# Patient Record
Sex: MALE | Race: WHITE | NOT HISPANIC OR LATINO | Employment: PART TIME | URBAN - METROPOLITAN AREA
[De-identification: names, ages, dates, MRNs, and addresses within clinical notes are randomized per-mention and may not be internally consistent; named-entity substitution may affect disease eponyms.]

---

## 2017-02-23 ENCOUNTER — COMMUNICATION - HEALTHEAST (OUTPATIENT)
Dept: ADMINISTRATIVE | Facility: CLINIC | Age: 57
End: 2017-02-23

## 2017-05-18 ENCOUNTER — RECORDS - HEALTHEAST (OUTPATIENT)
Dept: LAB | Facility: CLINIC | Age: 57
End: 2017-05-18

## 2017-05-18 LAB
CHOLEST SERPL-MCNC: 145 MG/DL
FASTING STATUS PATIENT QL REPORTED: ABNORMAL
HDLC SERPL-MCNC: 36 MG/DL
LDLC SERPL CALC-MCNC: 49 MG/DL
TRIGL SERPL-MCNC: 300 MG/DL

## 2017-06-02 ENCOUNTER — RECORDS - HEALTHEAST (OUTPATIENT)
Dept: LAB | Facility: CLINIC | Age: 57
End: 2017-06-02

## 2017-06-02 LAB
CHOLEST SERPL-MCNC: 243 MG/DL
FASTING STATUS PATIENT QL REPORTED: YES
HDLC SERPL-MCNC: 33 MG/DL
LDLC SERPL CALC-MCNC: 126 MG/DL
LDLC SERPL CALC-MCNC: ABNORMAL MG/DL
TRIGL SERPL-MCNC: 629 MG/DL

## 2019-09-29 ENCOUNTER — HEALTH MAINTENANCE LETTER (OUTPATIENT)
Age: 59
End: 2019-09-29

## 2020-03-15 ENCOUNTER — HEALTH MAINTENANCE LETTER (OUTPATIENT)
Age: 60
End: 2020-03-15

## 2021-01-14 ENCOUNTER — HEALTH MAINTENANCE LETTER (OUTPATIENT)
Age: 61
End: 2021-01-14

## 2021-05-08 ENCOUNTER — HEALTH MAINTENANCE LETTER (OUTPATIENT)
Age: 61
End: 2021-05-08

## 2021-08-28 ENCOUNTER — HEALTH MAINTENANCE LETTER (OUTPATIENT)
Age: 61
End: 2021-08-28

## 2021-10-23 ENCOUNTER — HEALTH MAINTENANCE LETTER (OUTPATIENT)
Age: 61
End: 2021-10-23

## 2021-12-18 ENCOUNTER — HEALTH MAINTENANCE LETTER (OUTPATIENT)
Age: 61
End: 2021-12-18

## 2022-02-12 ENCOUNTER — HEALTH MAINTENANCE LETTER (OUTPATIENT)
Age: 62
End: 2022-02-12

## 2022-04-09 ENCOUNTER — HEALTH MAINTENANCE LETTER (OUTPATIENT)
Age: 62
End: 2022-04-09

## 2022-07-30 ENCOUNTER — HEALTH MAINTENANCE LETTER (OUTPATIENT)
Age: 62
End: 2022-07-30

## 2022-08-24 ENCOUNTER — TRANSFERRED RECORDS (OUTPATIENT)
Dept: HEALTH INFORMATION MANAGEMENT | Facility: CLINIC | Age: 62
End: 2022-08-24

## 2022-09-08 ENCOUNTER — MEDICAL CORRESPONDENCE (OUTPATIENT)
Dept: HEALTH INFORMATION MANAGEMENT | Facility: CLINIC | Age: 62
End: 2022-09-08

## 2022-09-12 ENCOUNTER — TRANSCRIBE ORDERS (OUTPATIENT)
Dept: OTHER | Age: 62
End: 2022-09-12

## 2022-09-12 DIAGNOSIS — E11.9 TYPE 2 DIABETES MELLITUS WITHOUT COMPLICATION (H): Primary | ICD-10-CM

## 2022-10-10 ENCOUNTER — HEALTH MAINTENANCE LETTER (OUTPATIENT)
Age: 62
End: 2022-10-10

## 2022-11-27 ENCOUNTER — HEALTH MAINTENANCE LETTER (OUTPATIENT)
Age: 62
End: 2022-11-27

## 2023-01-03 ENCOUNTER — TRANSFERRED RECORDS (OUTPATIENT)
Dept: HEALTH INFORMATION MANAGEMENT | Facility: CLINIC | Age: 63
End: 2023-01-03

## 2023-02-18 ENCOUNTER — HEALTH MAINTENANCE LETTER (OUTPATIENT)
Age: 63
End: 2023-02-18

## 2023-03-08 ENCOUNTER — NURSE TRIAGE (OUTPATIENT)
Dept: NURSING | Facility: CLINIC | Age: 63
End: 2023-03-08
Payer: COMMERCIAL

## 2023-03-09 ENCOUNTER — TRANSFERRED RECORDS (OUTPATIENT)
Dept: HEALTH INFORMATION MANAGEMENT | Facility: CLINIC | Age: 63
End: 2023-03-09
Payer: COMMERCIAL

## 2023-03-09 ENCOUNTER — MEDICAL CORRESPONDENCE (OUTPATIENT)
Dept: HEALTH INFORMATION MANAGEMENT | Facility: CLINIC | Age: 63
End: 2023-03-09
Payer: COMMERCIAL

## 2023-03-09 LAB
ALT SERPL-CCNC: 19 U/L (ref 4–50)
AST SERPL-CCNC: 20 U/L (ref 12–35)
CREATININE (EXTERNAL): 0.9 MG/DL (ref 0.5–1.5)
GLUCOSE (EXTERNAL): 307 MG/DL (ref 60–115)
POTASSIUM (EXTERNAL): 4.3 MMOL/L (ref 3.6–5.1)

## 2023-03-09 NOTE — TELEPHONE ENCOUNTER
Wife called, she is not with patient.  They requested a refill of the patients insulin.  They have not seen it go through.  Wife was wanting to know if we are able to look up where it is in the process of getting refilled.  I explained I am not able to see that information.  Advised patients wife to call the clinic during clinic hours and they will be able to assist.  She will do that tomorrow.    Paula Lindo RN   03/08/23 6:32 PM  [facility] Nurse Advisor    Reason for Disposition    [1] Prescription refill request for NON-ESSENTIAL medicine (i.e., no harm to patient if med not taken) AND [2] triager unable to refill per department policy    Additional Information    Negative: New-onset or worsening symptoms, see that guideline (e.g., diarrhea, runny nose, sore throat)    Negative: Medicine question not related to refill or renewal    Negative: Caller (e.g., patient or pharmacist) requesting information about a new medicine    Negative: Caller requesting information unrelated to medicine    Negative: [1] Prescription refill request for ESSENTIAL medicine (i.e., likelihood of harm to patient if not taken) AND [2] triager unable to refill per department policy    Negative: [1] Prescription not at pharmacy AND [2] was prescribed by PCP recently  (Exception: triager has access to EMR and prescription is recorded there. Go to Home Care and confirm for pharmacy.)    Negative: [1] Pharmacy calling with prescription questions AND [2] triager unable to answer question    Negative: Prescription request for new medicine (not a refill)    Negative: Caller requesting a CONTROLLED substance prescription refill (e.g., narcotics, ADHD medicines)    Protocols used: MEDICATION REFILL AND RENEWAL CALL-A-

## 2023-03-10 DIAGNOSIS — I25.10 CAD (CORONARY ARTERY DISEASE): Primary | ICD-10-CM

## 2023-03-10 DIAGNOSIS — I25.10 ATHEROSCLEROTIC HEART DISEASE OF NATIVE CORONARY ARTERY WITHOUT ANGINA PECTORIS: ICD-10-CM

## 2023-03-25 ENCOUNTER — HEALTH MAINTENANCE LETTER (OUTPATIENT)
Age: 63
End: 2023-03-25

## 2023-04-21 ENCOUNTER — OFFICE VISIT (OUTPATIENT)
Dept: CARDIOLOGY | Facility: CLINIC | Age: 63
End: 2023-04-21
Attending: EMERGENCY MEDICINE
Payer: COMMERCIAL

## 2023-04-21 VITALS
BODY MASS INDEX: 38.55 KG/M2 | WEIGHT: 268.7 LBS | DIASTOLIC BLOOD PRESSURE: 82 MMHG | HEART RATE: 85 BPM | OXYGEN SATURATION: 97 % | SYSTOLIC BLOOD PRESSURE: 136 MMHG

## 2023-04-21 DIAGNOSIS — Z79.4 TYPE 2 DIABETES MELLITUS WITHOUT COMPLICATION, WITH LONG-TERM CURRENT USE OF INSULIN (H): ICD-10-CM

## 2023-04-21 DIAGNOSIS — I25.10 CORONARY ARTERY DISEASE INVOLVING NATIVE CORONARY ARTERY OF NATIVE HEART WITHOUT ANGINA PECTORIS: Primary | ICD-10-CM

## 2023-04-21 DIAGNOSIS — E88.810 METABOLIC SYNDROME X: ICD-10-CM

## 2023-04-21 DIAGNOSIS — I10 ESSENTIAL HYPERTENSION: ICD-10-CM

## 2023-04-21 DIAGNOSIS — Z72.0 TOBACCO ABUSE: ICD-10-CM

## 2023-04-21 DIAGNOSIS — E78.6 HDL DEFICIENCY: ICD-10-CM

## 2023-04-21 DIAGNOSIS — E11.9 TYPE 2 DIABETES MELLITUS WITHOUT COMPLICATION, WITH LONG-TERM CURRENT USE OF INSULIN (H): ICD-10-CM

## 2023-04-21 DIAGNOSIS — I21.4 NSTEMI (NON-ST ELEVATED MYOCARDIAL INFARCTION) (H): ICD-10-CM

## 2023-04-21 DIAGNOSIS — E78.2 MIXED HYPERLIPIDEMIA: ICD-10-CM

## 2023-04-21 DIAGNOSIS — R60.0 PERIPHERAL EDEMA: ICD-10-CM

## 2023-04-21 PROCEDURE — 93000 ELECTROCARDIOGRAM COMPLETE: CPT | Performed by: INTERNAL MEDICINE

## 2023-04-21 PROCEDURE — 99204 OFFICE O/P NEW MOD 45 MIN: CPT | Performed by: INTERNAL MEDICINE

## 2023-04-21 RX ORDER — CLOPIDOGREL BISULFATE 75 MG/1
75 TABLET ORAL DAILY
COMMUNITY
Start: 2023-04-21 | End: 2023-10-10

## 2023-04-21 RX ORDER — FLUTICASONE FUROATE AND VILANTEROL 100; 25 UG/1; UG/1
1 POWDER RESPIRATORY (INHALATION) DAILY
COMMUNITY

## 2023-04-21 RX ORDER — UBIDECARENONE 200 MG
200 CAPSULE ORAL
COMMUNITY
Start: 2023-04-21

## 2023-04-21 RX ORDER — ATORVASTATIN CALCIUM 80 MG/1
80 TABLET, FILM COATED ORAL DAILY
COMMUNITY
Start: 2023-04-21

## 2023-04-21 RX ORDER — METOPROLOL TARTRATE 100 MG
100 TABLET ORAL 2 TIMES DAILY
COMMUNITY
Start: 2023-04-21

## 2023-04-21 RX ORDER — AMLODIPINE BESYLATE 10 MG/1
10 TABLET ORAL DAILY
COMMUNITY
Start: 2023-04-21

## 2023-04-21 RX ORDER — CLONAZEPAM 0.5 MG/1
0.25 TABLET ORAL
COMMUNITY
Start: 2023-04-21

## 2023-04-21 RX ORDER — LISINOPRIL AND HYDROCHLOROTHIAZIDE 12.5; 2 MG/1; MG/1
1 TABLET ORAL DAILY
COMMUNITY
Start: 2023-04-21

## 2023-04-21 NOTE — LETTER
4/21/2023    Jasmin Orlando MD  [facility] [address] [facility] [address]    RE: Lencho Echevarria       Dear Colleague,     I had the pleasure of seeing Lencho Echevarria in the [facility].  Mr. Chilel is a 62-year-old gentleman who used to follow with my partner Dr. Jaden Garcia.  He has a past medical history significant for obesity mixed hyperlipidemia with marked hypertriglyceridemia and HDL deficiency, diabetes mellitus, hypertension, coronary disease, diabetes mellitus, metabolic syndrome, COPD with ongoing tobacco abuse 1 pack/day, former EtOH abuse.  He recently established with a new primary care doctor who recommended he follow-up with cardiology again.    Coronary history is significant for stent being placed to his left anterior descending artery in 2011 and 2 stents placed in his right coronary artery in 2015 at which time he was noted to have a  of his diagonal and a 30% in-stent restenosis of his LAD stent with 80% apical LAD stenosis.  Care everywhere shows an echocardiogram in 2018 described an ejection fraction of 65 to 70% without focal wall motion abnormality.  Most recent fasting lipid profile I can find in care everywhere is from 2017 with a total cholesterol 243 and HDL of 33 and triglycerides of 629.    Lencho has no chest arm neck jaw or shoulders comfort.  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  He unfortunately does not follow any specific diet.  He states he has stopped alcohol but unfortunately still smoking 1 pack of cigarettes per day    Assessment and plan.  Lencho has no symptoms at this time to suggest ischemia.  Nonetheless I think we should evaluate his coronary anatomy.  I will set him up for a Lexiscan as he does not think he can do the treadmill.    He has no symptoms to suggest heart failure or significant  arrhythmia.    Blood pressure is well controlled at 136/82 with a pulse of 85.  Weight is unfortunately 268 pounds.  We talked about the importance of regular exercise regimen and trying to lose weight    We talked about the importance of stopping all tobacco.    When he comes in for his Lexiscan I will repeat his fasting lipid profile.    Reviewed all of his medications and updated them.  Further adjustments will be made depending upon the above results.    Review of lab from [place] does not show a recent cholesterol but does show a sugar of 307 that I will leave to his primary care doctor to address.    EKG today demonstrates normal sinus rhythm, poor R wave progression across anterior precordium but otherwise normal.    Follow-up with my ANASTASIA after the above studies are done.  We will proceed as appropriate depending on the results.  I told him I think he does need to follow with cardiology long-term for his multiple risk factors.  Thank you for allow me to participate in this patient's care.  Sincerely,                               Dejon Perkins MD [facility]      Today's clinic visit entailed:  Review of external notes as documented elsewhere in note  Review of the result(s) of each unique test - Echocardiogram, EKG, lab work, angiogram  The following tests were independently interpreted by me as noted in my documentation: EKG  Ordering of each unique test  Prescription drug management  55 minutes spent by me on the date of the encounter doing chart review, history and exam, documentation and further activities per the note  Provider  Link to [facility] Help Grid     The level of medical decision making during this visit was of moderate complexity.      Orders Placed This Encounter   Procedures    Lipid Profile    ALT    Basic metabolic panel    Basic metabolic panel    Lipid Profile    ALT    Follow-Up with Cardiology ANASTASIA    Follow-Up with Cardiology    Follow-Up with Cardiology    EKG 12-lead complete w/read -  Clinics (performed today)       Orders Placed This Encounter   Medications    fluticasone-vilanterol (BREO ELLIPTA) 100-25 MCG/ACT inhaler     Sig: Inhale 1 puff into the lungs daily    insulin detemir (LEVEMIR PEN) 100 UNIT/ML pen     Sig: Inject 40 Units Subcutaneous At Bedtime    clopidogrel (PLAVIX) 75 MG tablet     Sig: Take 1 tablet (75 mg) by mouth daily    metoprolol tartrate (LOPRESSOR) 100 MG tablet     Sig: Take 1 tablet (100 mg) by mouth 2 times daily    amLODIPine (NORVASC) 10 MG tablet     Sig: Take 1 tablet (10 mg) by mouth daily    lisinopril-hydrochlorothiazide (ZESTORETIC) 20-12.5 MG tablet     Sig: Take 1 tablet by mouth daily    atorvastatin (LIPITOR) 80 MG tablet     Sig: Take 1 tablet (80 mg) by mouth daily    clonazePAM (KLONOPIN) 0.5 MG tablet     Sig: Take 0.5 tablets (0.25 mg) by mouth nightly as needed for anxiety    coenzyme Q-10 200 MG CAPS capsule     Sig: Take 1 capsule (200 mg) by mouth       Medications Discontinued During This Encounter   Medication Reason    prasugrel (EFFIENT) 10 MG TABS     metoprolol (LOPRESSOR) 50 MG tablet     FENOFIBRATE PO     ALPRAZOLAM PO     LISINOPRIL PO     atorvastatin (LIPITOR) 40 MG tablet          Encounter Diagnoses   Name Primary?    Coronary artery disease involving native coronary artery of native heart without angina pectoris Yes    NSTEMI (non-ST elevated myocardial infarction) (H)     HDL deficiency     Tobacco abuse     Type 2 diabetes mellitus without complication, with long-term current use of insulin (H)     Metabolic syndrome X     Mixed hyperlipidemia     Essential hypertension     Peripheral edema        CURRENT MEDICATIONS:  Current Outpatient Medications   Medication Sig Dispense Refill    amLODIPine (NORVASC) 10 MG tablet Take 1 tablet (10 mg) by mouth daily      aspirin EC 81 MG EC tablet Take 1 tablet (81 mg) by mouth daily 30 tablet 5    atorvastatin (LIPITOR) 80 MG tablet Take 1 tablet (80 mg) by mouth daily      clonazePAM  (KLONOPIN) 0.5 MG tablet Take 0.5 tablets (0.25 mg) by mouth nightly as needed for anxiety      clopidogrel (PLAVIX) 75 MG tablet Take 1 tablet (75 mg) by mouth daily      coenzyme Q-10 200 MG CAPS capsule Take 1 capsule (200 mg) by mouth      fluticasone-vilanterol (BREO ELLIPTA) 100-25 MCG/ACT inhaler Inhale 1 puff into the lungs daily      insulin aspart (NOVOLOG PEN) 100 UNIT/ML soln Inject 8 Units Subcutaneous 3 times daily (with meals) 3 Month 2    insulin aspart prot & aspart (NOVOLOG MIX 70/30 FLEXPEN) 100 UNITS/ML susp Inject 25 Units Subcutaneous every morning      insulin aspart prot & aspart (NOVOLOG MIX 70/30 FLEXPEN) 100 UNITS/ML susp Inject 30 Units Subcutaneous At Bedtime      insulin detemir (LEVEMIR PEN) 100 UNIT/ML pen Inject 40 Units Subcutaneous At Bedtime      lisinopril-hydrochlorothiazide (ZESTORETIC) 20-12.5 MG tablet Take 1 tablet by mouth daily      metoprolol tartrate (LOPRESSOR) 100 MG tablet Take 1 tablet (100 mg) by mouth 2 times daily      nitroglycerin (NITROSTAT) 0.4 MG SL tablet Place 1 tablet (0.4 mg) under the tongue every 5 minutes as needed for chest pain 25 tablet 0       ALLERGIES     Allergies   Allergen Reactions    Amoxicillin        PAST MEDICAL HISTORY:  Past Medical History:   Diagnosis Date    CAD (coronary artery disease)     DM (diabetes mellitus), type 2 (H)     HTN (hypertension)     Hypercholesterolemia     Hyperlipidemia     Myocardial infarction (H)     07-10-15    Tobacco abuse        PAST SURGICAL HISTORY:  Past Surgical History:   Procedure Laterality Date    ANGIOGRAM  07-11-15    2 vessel coronary artery disease (diagonal-, apical LAD, and culprit mid to distal RCA). Successful PCI of culprit mid to distal RCA with placement of a 3.5 x 12 mm and 3.5 x 38 mm Promus drug-eluting stents     CARDIAC CATHETERIZATION      CARDIAC CATHETERIZATION  11/04/2016    no interventions    CARDIAC SURGERY      stent    CARDIAC SURGERY      CORONARY ANGIOPLASTY       "CORONARY STENT PLACEMENT  2011    LAD stent, total 3 stents    HERNIA REPAIR      HERNIA REPAIR      KNEE SURGERY      X 2    KNEE SURGERY      NEPHRECTOMY Right 12/12/2016    Procedure: ROBOTIC ASSISTED RIGHT NEPHRECTOMY WITH INTRA OPERATIVE ULTRASOUND;  Surgeon: Makenna Miller MD;  Location: [facility];  Service:        FAMILY HISTORY:  Family History   Problem Relation Age of Onset    Other Cancer Father     Hypertension Mother     Other Cancer Mother     Kidney Disease Sister     Pacemaker Mother     Cancer Mother     Cancer Father     Atrial fibrillation Brother     Acute Myocardial Infarction No family hx of        SOCIAL HISTORY:  Social History     Socioeconomic History    Marital status:      Spouse name: None    Number of children: None    Years of education: None    Highest education level: None   Tobacco Use    Smoking status: Every Day     Packs/day: 1.00     Types: Cigarettes    Smokeless tobacco: Never   Substance and Sexual Activity    Alcohol use: No    Drug use: Yes     Frequency: 2.0 times per week     Types: Marijuana       Review of Systems:  Skin:  Positive for bruising     Eyes:  not assessed      ENT:  Positive for hearing loss hearing aids  Respiratory:  Negative       Cardiovascular:  Negative      Gastroenterology: Negative      Genitourinary:  Positive for urinary frequency;urgency \"rx make me go\"  Musculoskeletal:  Positive for back pain    Neurologic:  Negative      Psychiatric:  Negative      Heme/Lymph/Imm:  Positive for allergies    Endocrine:  Positive for diabetes type II    Physical Exam:  Vitals: /82 (BP Location: Right arm, Patient Position: Sitting, Cuff Size: Adult Large)   Pulse 85   Wt 121.9 kg (268 lb 11.2 oz)   SpO2 97%   BMI 38.55 kg/m      Constitutional:  cooperative, alert and oriented, well developed, well nourished, in no acute distress obese      Skin:  warm and dry to the touch, no apparent skin lesions or masses noted      "     Head:  normocephalic, no masses or lesions        Eyes:  pupils equal and round, conjunctivae and lids unremarkable, sclera white, no xanthalasma, EOMS intact, no nystagmus        Lymph:      ENT:  no pallor or cyanosis, dentition good        Neck:  no carotid bruit        Respiratory:  normal breath sounds, clear to auscultation, normal A-P diameter, normal symmetry, normal respiratory excursion, no use of accessory muscles         Cardiac: regular rhythm;no murmurs, gallops or rubs detected                pulses full and equal                                        GI:           Extremities and Muscular Skeletal:  no edema;no spinal abnormalities noted;normal muscle strength and tone              Neurological:  no gross motor deficits        Psych:  affect appropriate, oriented to time, person and place        CC  Jasmin Orlando MD  [facility]  [address] [place] [address]          Thank you for allowing me to participate in the care of your patient.      Sincerely,     Dejon Perkins MD     [facility]

## 2023-04-21 NOTE — PROGRESS NOTES
Mr. Chilel is a 62-year-old gentleman who used to follow with my partner Dr. Jaden Garcia.  He has a past medical history significant for obesity mixed hyperlipidemia with marked hypertriglyceridemia and HDL deficiency, diabetes mellitus, hypertension, coronary disease, diabetes mellitus, metabolic syndrome, COPD with ongoing tobacco abuse 1 pack/day, former EtOH abuse.  He recently established with a new primary care doctor who recommended he follow-up with cardiology again.    Coronary history is significant for stent being placed to his left anterior descending artery in 2011 and 2 stents placed in his right coronary artery in 2015 at which time he was noted to have a  of his diagonal and a 30% in-stent restenosis of his LAD stent with 80% apical LAD stenosis.  Care everywhere shows an echocardiogram in 2018 described an ejection fraction of 65 to 70% without focal wall motion abnormality.  Most recent fasting lipid profile I can find in care everywhere is from 2017 with a total cholesterol 243 and HDL of 33 and triglycerides of 629.    Lencho has no chest arm neck jaw or shoulders comfort.  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  He unfortunately does not follow any specific diet.  He states he has stopped alcohol but unfortunately still smoking 1 pack of cigarettes per day    Assessment and plan.  Lencho has no symptoms at this time to suggest ischemia.  Nonetheless I think we should evaluate his coronary anatomy.  I will set him up for a Lexiscan as he does not think he can do the treadmill.    He has no symptoms to suggest heart failure or significant arrhythmia.    Blood pressure is well controlled at 136/82 with a pulse of 85.  Weight is unfortunately 268 pounds.  We talked about the importance of regular exercise regimen and trying to lose weight    We talked about the importance  of stopping all tobacco.    When he comes in for his Lexiscan I will repeat his fasting lipid profile.    Reviewed all of his medications and updated them.  Further adjustments will be made depending upon the above results.    Review of lab from [place] does not show a recent cholesterol but does show a sugar of 307 that I will leave to his primary care doctor to address.    EKG today demonstrates normal sinus rhythm, poor R wave progression across anterior precordium but otherwise normal.    Follow-up with my ANASTASIA after the above studies are done.  We will proceed as appropriate depending on the results.  I told him I think he does need to follow with cardiology long-term for his multiple risk factors.  Thank you for allow me to participate in this patient's care.  Sincerely,                               Dejon Perkins MD [facility]      Today's clinic visit entailed:  Review of external notes as documented elsewhere in note  Review of the result(s) of each unique test - Echocardiogram, EKG, lab work, angiogram  The following tests were independently interpreted by me as noted in my documentation: EKG  Ordering of each unique test  Prescription drug management  55 minutes spent by me on the date of the encounter doing chart review, history and exam, documentation and further activities per the note  Provider  Link to [facility] Help Grid     The level of medical decision making during this visit was of moderate complexity.      Orders Placed This Encounter   Procedures     Lipid Profile     ALT     Basic metabolic panel     Basic metabolic panel     Lipid Profile     ALT     Follow-Up with Cardiology ANASTASIA     Follow-Up with Cardiology     Follow-Up with Cardiology     EKG 12-lead complete w/read - Clinics (performed today)       Orders Placed This Encounter   Medications     fluticasone-vilanterol (BREO ELLIPTA) 100-25 MCG/ACT inhaler     Sig: Inhale 1 puff into the lungs daily     insulin detemir (LEVEMIR PEN) 100 UNIT/ML  pen     Sig: Inject 40 Units Subcutaneous At Bedtime     clopidogrel (PLAVIX) 75 MG tablet     Sig: Take 1 tablet (75 mg) by mouth daily     metoprolol tartrate (LOPRESSOR) 100 MG tablet     Sig: Take 1 tablet (100 mg) by mouth 2 times daily     amLODIPine (NORVASC) 10 MG tablet     Sig: Take 1 tablet (10 mg) by mouth daily     lisinopril-hydrochlorothiazide (ZESTORETIC) 20-12.5 MG tablet     Sig: Take 1 tablet by mouth daily     atorvastatin (LIPITOR) 80 MG tablet     Sig: Take 1 tablet (80 mg) by mouth daily     clonazePAM (KLONOPIN) 0.5 MG tablet     Sig: Take 0.5 tablets (0.25 mg) by mouth nightly as needed for anxiety     coenzyme Q-10 200 MG CAPS capsule     Sig: Take 1 capsule (200 mg) by mouth       Medications Discontinued During This Encounter   Medication Reason     prasugrel (EFFIENT) 10 MG TABS      metoprolol (LOPRESSOR) 50 MG tablet      FENOFIBRATE PO      ALPRAZOLAM PO      LISINOPRIL PO      atorvastatin (LIPITOR) 40 MG tablet          Encounter Diagnoses   Name Primary?     Coronary artery disease involving native coronary artery of native heart without angina pectoris Yes     NSTEMI (non-ST elevated myocardial infarction) (H)      HDL deficiency      Tobacco abuse      Type 2 diabetes mellitus without complication, with long-term current use of insulin (H)      Metabolic syndrome X      Mixed hyperlipidemia      Essential hypertension      Peripheral edema        CURRENT MEDICATIONS:  Current Outpatient Medications   Medication Sig Dispense Refill     amLODIPine (NORVASC) 10 MG tablet Take 1 tablet (10 mg) by mouth daily       aspirin EC 81 MG EC tablet Take 1 tablet (81 mg) by mouth daily 30 tablet 5     atorvastatin (LIPITOR) 80 MG tablet Take 1 tablet (80 mg) by mouth daily       clonazePAM (KLONOPIN) 0.5 MG tablet Take 0.5 tablets (0.25 mg) by mouth nightly as needed for anxiety       clopidogrel (PLAVIX) 75 MG tablet Take 1 tablet (75 mg) by mouth daily       coenzyme Q-10 200 MG CAPS  capsule Take 1 capsule (200 mg) by mouth       fluticasone-vilanterol (BREO ELLIPTA) 100-25 MCG/ACT inhaler Inhale 1 puff into the lungs daily       insulin aspart (NOVOLOG PEN) 100 UNIT/ML soln Inject 8 Units Subcutaneous 3 times daily (with meals) 3 Month 2     insulin aspart prot & aspart (NOVOLOG MIX 70/30 FLEXPEN) 100 UNITS/ML susp Inject 25 Units Subcutaneous every morning       insulin aspart prot & aspart (NOVOLOG MIX 70/30 FLEXPEN) 100 UNITS/ML susp Inject 30 Units Subcutaneous At Bedtime       insulin detemir (LEVEMIR PEN) 100 UNIT/ML pen Inject 40 Units Subcutaneous At Bedtime       lisinopril-hydrochlorothiazide (ZESTORETIC) 20-12.5 MG tablet Take 1 tablet by mouth daily       metoprolol tartrate (LOPRESSOR) 100 MG tablet Take 1 tablet (100 mg) by mouth 2 times daily       nitroglycerin (NITROSTAT) 0.4 MG SL tablet Place 1 tablet (0.4 mg) under the tongue every 5 minutes as needed for chest pain 25 tablet 0       ALLERGIES     Allergies   Allergen Reactions     Amoxicillin        PAST MEDICAL HISTORY:  Past Medical History:   Diagnosis Date     CAD (coronary artery disease)      DM (diabetes mellitus), type 2 (H)      HTN (hypertension)      Hypercholesterolemia      Hyperlipidemia      Myocardial infarction (H)     07-10-15     Tobacco abuse        PAST SURGICAL HISTORY:  Past Surgical History:   Procedure Laterality Date     ANGIOGRAM  07-11-15    2 vessel coronary artery disease (diagonal-, apical LAD, and culprit mid to distal RCA). Successful PCI of culprit mid to distal RCA with placement of a 3.5 x 12 mm and 3.5 x 38 mm Promus drug-eluting stents      CARDIAC CATHETERIZATION       CARDIAC CATHETERIZATION  11/04/2016    no interventions     CARDIAC SURGERY      stent     CARDIAC SURGERY       CORONARY ANGIOPLASTY       CORONARY STENT PLACEMENT  2011    LAD stent, total 3 stents     HERNIA REPAIR       HERNIA REPAIR       KNEE SURGERY      X 2     KNEE SURGERY       NEPHRECTOMY Right 12/12/2016  "   Procedure: ROBOTIC ASSISTED RIGHT NEPHRECTOMY WITH INTRA OPERATIVE ULTRASOUND;  Surgeon: Makenna Miller MD;  Location: [facility] [place];  Service:        FAMILY HISTORY:  Family History   Problem Relation Age of Onset     Other Cancer Father      Hypertension Mother      Other Cancer Mother      Kidney Disease Sister      Pacemaker Mother      Cancer Mother      Cancer Father      Atrial fibrillation Brother      Acute Myocardial Infarction No family hx of        SOCIAL HISTORY:  Social History     Socioeconomic History     Marital status:      Spouse name: None     Number of children: None     Years of education: None     Highest education level: None   Tobacco Use     Smoking status: Every Day     Packs/day: 1.00     Types: Cigarettes     Smokeless tobacco: Never   Substance and Sexual Activity     Alcohol use: No     Drug use: Yes     Frequency: 2.0 times per week     Types: Marijuana       Review of Systems:  Skin:  Positive for bruising     Eyes:  not assessed      ENT:  Positive for hearing loss hearing aids  Respiratory:  Negative       Cardiovascular:  Negative      Gastroenterology: Negative      Genitourinary:  Positive for urinary frequency;urgency \"rx make me go\"  Musculoskeletal:  Positive for back pain    Neurologic:  Negative      Psychiatric:  Negative      Heme/Lymph/Imm:  Positive for allergies    Endocrine:  Positive for diabetes type II    Physical Exam:  Vitals: /82 (BP Location: Right arm, Patient Position: Sitting, Cuff Size: Adult Large)   Pulse 85   Wt 121.9 kg (268 lb 11.2 oz)   SpO2 97%   BMI 38.55 kg/m      Constitutional:  cooperative, alert and oriented, well developed, well nourished, in no acute distress obese      Skin:  warm and dry to the touch, no apparent skin lesions or masses noted          Head:  normocephalic, no masses or lesions        Eyes:  pupils equal and round, conjunctivae and lids unremarkable, sclera white, no xanthalasma, EOMS intact, " no nystagmus        Lymph:      ENT:  no pallor or cyanosis, dentition good        Neck:  no carotid bruit        Respiratory:  normal breath sounds, clear to auscultation, normal A-P diameter, normal symmetry, normal respiratory excursion, no use of accessory muscles         Cardiac: regular rhythm;no murmurs, gallops or rubs detected                pulses full and equal                                        GI:           Extremities and Muscular Skeletal:  no edema;no spinal abnormalities noted;normal muscle strength and tone              Neurological:  no gross motor deficits        Psych:  affect appropriate, oriented to time, person and place        CC  Jasmin Orlando MD  [facility]  [address] [place] [address]

## 2023-04-28 ENCOUNTER — HOSPITAL ENCOUNTER (OUTPATIENT)
Dept: NUCLEAR MEDICINE | Facility: CLINIC | Age: 63
Setting detail: NUCLEAR MEDICINE
Discharge: HOME | End: 2023-04-28
Attending: INTERNAL MEDICINE
Payer: COMMERCIAL

## 2023-04-28 ENCOUNTER — HOSPITAL ENCOUNTER (OUTPATIENT)
Dept: CARDIOLOGY | Facility: CLINIC | Age: 63
Discharge: HOME | End: 2023-04-28
Attending: INTERNAL MEDICINE
Payer: COMMERCIAL

## 2023-04-28 ENCOUNTER — TELEPHONE (OUTPATIENT)
Dept: CARDIOLOGY | Facility: CLINIC | Age: 63
End: 2023-04-28

## 2023-04-28 VITALS — DIASTOLIC BLOOD PRESSURE: 73 MMHG | SYSTOLIC BLOOD PRESSURE: 115 MMHG | HEART RATE: 90 BPM

## 2023-04-28 DIAGNOSIS — I25.10 CORONARY ARTERY DISEASE INVOLVING NATIVE CORONARY ARTERY OF NATIVE HEART WITHOUT ANGINA PECTORIS: ICD-10-CM

## 2023-04-28 LAB
CV BLOOD PRESSURE: 58 MMHG
CV STRESS MAX HR HE: 90
NUC STRESS EJECTION FRACTION: 54 %
RATE PRESSURE PRODUCT: NORMAL
STRESS ECHO BASELINE DIASTOLIC HE: 75
STRESS ECHO BASELINE HR: 69 BPM
STRESS ECHO BASELINE SYSTOLIC BP: 124
STRESS ECHO CALCULATED PERCENT HR: 58 %
STRESS ECHO LAST STRESS DIASTOLIC BP: 86
STRESS ECHO LAST STRESS SYSTOLIC BP: 132
STRESS ECHO TARGET HR: 156
STRESS/REST PERFUSION RATIO: 1.16

## 2023-04-28 PROCEDURE — 343N000001 HC RX 343: Performed by: INTERNAL MEDICINE

## 2023-04-28 PROCEDURE — 78452 HT MUSCLE IMAGE SPECT MULT: CPT

## 2023-04-28 PROCEDURE — A9502 TC99M TETROFOSMIN: HCPCS | Performed by: INTERNAL MEDICINE

## 2023-04-28 PROCEDURE — 93017 CV STRESS TEST TRACING ONLY: CPT

## 2023-04-28 PROCEDURE — 93018 CV STRESS TEST I&R ONLY: CPT | Performed by: INTERNAL MEDICINE

## 2023-04-28 PROCEDURE — 250N000011 HC RX IP 250 OP 636

## 2023-04-28 PROCEDURE — 78452 HT MUSCLE IMAGE SPECT MULT: CPT | Mod: 26 | Performed by: INTERNAL MEDICINE

## 2023-04-28 PROCEDURE — 93016 CV STRESS TEST SUPVJ ONLY: CPT | Performed by: INTERNAL MEDICINE

## 2023-04-28 RX ORDER — REGADENOSON 0.08 MG/ML
0.4 INJECTION, SOLUTION INTRAVENOUS ONCE
Status: COMPLETED | OUTPATIENT
Start: 2023-04-28 | End: 2023-04-28

## 2023-04-28 RX ORDER — AMINOPHYLLINE 25 MG/ML
50-100 INJECTION, SOLUTION INTRAVENOUS
Status: DISCONTINUED | OUTPATIENT
Start: 2023-04-28 | End: 2023-04-29 | Stop reason: HOSPADM

## 2023-04-28 RX ORDER — ACYCLOVIR 200 MG/1
0-1 CAPSULE ORAL
Status: DISCONTINUED | OUTPATIENT
Start: 2023-04-28 | End: 2023-04-29 | Stop reason: HOSPADM

## 2023-04-28 RX ORDER — REGADENOSON 0.08 MG/ML
INJECTION, SOLUTION INTRAVENOUS
Status: COMPLETED
Start: 2023-04-28 | End: 2023-04-28

## 2023-04-28 RX ORDER — ALBUTEROL SULFATE 90 UG/1
2 AEROSOL, METERED RESPIRATORY (INHALATION) EVERY 5 MIN PRN
Status: DISCONTINUED | OUTPATIENT
Start: 2023-04-28 | End: 2023-04-29 | Stop reason: HOSPADM

## 2023-04-28 RX ADMIN — TETROFOSMIN 10.6 MILLICURIE: 1.38 INJECTION, POWDER, LYOPHILIZED, FOR SOLUTION INTRAVENOUS at 08:30

## 2023-04-28 RX ADMIN — REGADENOSON 0.4 MG: 0.08 INJECTION, SOLUTION INTRAVENOUS at 10:04

## 2023-04-28 RX ADMIN — TETROFOSMIN 33 MILLICURIE: 1.38 INJECTION, POWDER, LYOPHILIZED, FOR SOLUTION INTRAVENOUS at 10:21

## 2023-04-28 NOTE — PROGRESS NOTES
Patient here for med portion of Laura scan stress test.  Arrived somewhat SOB.  With injection of Laura scan increased SOB, but resolved to baseline quickly.  Pt escorted, ambulatory, back to radiology waiting area to wait for remaining portion of test.  Vital signs remained stable thru out test.  Sarah Pineda RN

## 2023-04-28 NOTE — TELEPHONE ENCOUNTER
Dejon Perkins MD  You 8 minutes ago (4:20 PM)     Stress test is consistent with his known anatomy of a  of his diagonal. As he is asymptomatic will continue with medical management and lifestyle changes. He was supposed to have a fasting lipid profile done as well I will review that as available        Lab draw is scheduled for Mon 5/1. Called patient to review stress test results, left a detailed message with Dr Perkins's reply and to call team 2 back with any questions.

## 2023-04-28 NOTE — TELEPHONE ENCOUNTER
Stress test completed as below, ordered by Dr Perkins for re-assessment of coronary disease, hx CAD/stents. No symptoms reported by patient at visit 4/21/23. Routed to provider to review.        The nuclear stress test is abnormal.     There is a small area of nontransmural infarction in the mid to distal anterior segments of the left ventricle associated with a mild degree of audra-infarct ischemia.     Left ventricular function is normal.     The left ventricular ejection fraction at rest is 58%.  The left ventricular ejection fraction at stress is 54%.     LV cavity size normal.     Stress to rest cavity ratio is 1.16.  TID is absent.     There is a mild decrease in radiotracer uptake in the basal to mid inferior wall segments mildly worse on stress compared to rest images with preserved wall motion, and significant overlying soft tissue and subdiaphragmatic activity, most likely consistent with diaphragm attenuation and bowel uptake artifact, rather than a small area of mild myocardial ischemia.     A prior study was conducted on 2/11/2013.  This study has changes noted when compared with the prior study.  [place]: Dr [place]

## 2023-05-01 ENCOUNTER — TELEPHONE (OUTPATIENT)
Dept: CARDIOLOGY | Facility: CLINIC | Age: 63
End: 2023-05-01

## 2023-05-01 ENCOUNTER — LAB (OUTPATIENT)
Dept: LAB | Facility: CLINIC | Age: 63
End: 2023-05-01
Payer: COMMERCIAL

## 2023-05-01 DIAGNOSIS — I25.10 CORONARY ARTERY DISEASE INVOLVING NATIVE CORONARY ARTERY OF NATIVE HEART WITHOUT ANGINA PECTORIS: ICD-10-CM

## 2023-05-01 LAB
ALT SERPL W P-5'-P-CCNC: 16 U/L (ref 10–50)
ANION GAP SERPL CALCULATED.3IONS-SCNC: 10 MMOL/L (ref 7–15)
BUN SERPL-MCNC: 17.3 MG/DL (ref 8–23)
CALCIUM SERPL-MCNC: 7.7 MG/DL (ref 8.8–10.2)
CHLORIDE SERPL-SCNC: 98 MMOL/L (ref 98–107)
CHOLEST SERPL-MCNC: 145 MG/DL
CREAT SERPL-MCNC: 0.91 MG/DL (ref 0.67–1.17)
DEPRECATED HCO3 PLAS-SCNC: 27 MMOL/L (ref 22–29)
GFR SERPL CREATININE-BSD FRML MDRD: >90 ML/MIN/1.73M2
GLUCOSE SERPL-MCNC: 308 MG/DL (ref 70–99)
HDLC SERPL-MCNC: 40 MG/DL
LDLC SERPL CALC-MCNC: 54 MG/DL
NONHDLC SERPL-MCNC: 105 MG/DL
POTASSIUM SERPL-SCNC: 4.2 MMOL/L (ref 3.4–5.3)
SODIUM SERPL-SCNC: 135 MMOL/L (ref 136–145)
TRIGL SERPL-MCNC: 255 MG/DL

## 2023-05-01 PROCEDURE — 80048 BASIC METABOLIC PNL TOTAL CA: CPT | Performed by: INTERNAL MEDICINE

## 2023-05-01 PROCEDURE — 80061 LIPID PANEL: CPT | Performed by: INTERNAL MEDICINE

## 2023-05-01 PROCEDURE — 84460 ALANINE AMINO (ALT) (SGPT): CPT | Performed by: INTERNAL MEDICINE

## 2023-05-01 PROCEDURE — 36415 COLL VENOUS BLD VENIPUNCTURE: CPT | Performed by: INTERNAL MEDICINE

## 2023-05-01 NOTE — TELEPHONE ENCOUNTER
BMP  And FLP/ALT done 5-1-23  Chol 145, HDL 40, LDL 54, ,     Next ANASTASIA OV 6-2-23.     Last Dr. Perkins OV 4-21-23 Medication changes - Atorvastatin was increased to 80 mg daily.

## 2023-05-01 NOTE — TELEPHONE ENCOUNTER
Reply from Dr. Perkins:  Fasting lipid profile is OK except for trigs which are reflection of his glucose.  He needs to quit smoking, exercise regularly, watch the carbohydrates in his diet and lose weight.  I think he should have an ANASTASIA appointment, up to go over everything and he should see me in 1 year.  With fasting lipid profile and BMP        Attempted to contact patient to review lab results and Dr. Duff's recommendations. Message left earlier with results of nuclear study. Left message for patient to call back.    5/2/2023 patient has scheduled an ANASTASIA visit on 6/2/2023 with ANASTASIA Carrie Richard. Results of lipid panel and Dr. Perkins's recommendations sent to patient on his my chart account.

## 2023-06-02 ENCOUNTER — OFFICE VISIT (OUTPATIENT)
Dept: CARDIOLOGY | Facility: CLINIC | Age: 63
End: 2023-06-02
Attending: INTERNAL MEDICINE
Payer: COMMERCIAL

## 2023-06-02 VITALS
HEART RATE: 81 BPM | BODY MASS INDEX: 38.93 KG/M2 | DIASTOLIC BLOOD PRESSURE: 82 MMHG | HEIGHT: 70 IN | OXYGEN SATURATION: 96 % | SYSTOLIC BLOOD PRESSURE: 134 MMHG | WEIGHT: 271.9 LBS

## 2023-06-02 DIAGNOSIS — Z72.0 TOBACCO ABUSE: ICD-10-CM

## 2023-06-02 DIAGNOSIS — E78.2 MIXED HYPERLIPIDEMIA: ICD-10-CM

## 2023-06-02 DIAGNOSIS — I10 ESSENTIAL HYPERTENSION: ICD-10-CM

## 2023-06-02 DIAGNOSIS — I25.10 CORONARY ARTERY DISEASE INVOLVING NATIVE CORONARY ARTERY OF NATIVE HEART WITHOUT ANGINA PECTORIS: Primary | ICD-10-CM

## 2023-06-02 PROCEDURE — 99214 OFFICE O/P EST MOD 30 MIN: CPT | Performed by: NURSE PRACTITIONER

## 2023-06-02 NOTE — LETTER
6/2/2023    Jasmin Orlando MD  [facility] [address] [facility] [address]    RE: Lencho Echevarria       Dear Colleague,     I had the pleasure of seeing Lencho Echevarria in the [facility].  Cardiology Clinic Progress Note  Lencho Echevarria MRN# [id number]   YOB: 1960 Age: 62 year old   Primary Cardiologist: Dr. Perkins  Reason for visit: Cardiology follow up            Assessment and Plan:   Lencho Echevarria is a very pleasant 62 year old male here for cardiology follow up.     1. Coronary artery disease - PCI to LAD with ZACH x 1 in 2011, PCI to RCA with ZACH x 2 in 2015 and at this time was noted to have  of his diagonal and 30% in-stent restenosis of his LAD stent with 80% apical LAD stenosis.    Continue aspirin, plavix, atorvastatin and betablocker  2. Hypertension - controlled  3. Mixed hyperlipidemia - lipid panel 5/2023 showed total cholesterol 145, HDL 40, LDL 54 and triglycerides 255.    - Continue atorvastatin   4. Tobacco use - pre-contemplative, smoking 1ppd, counseled on smoking cessation  5. DM - insulin dependent  6. Obesity      I saw patient today for follow up after cardiac testing. Nuclear stress test showed 4/28/23 showed small area of nontransmural infarction in the mid to distal anterior segments of the LV associated with mild degree of audra-infarct ischemia. This is consistent with his known anatomy of a  of his diagonal. Normal LVEF.     Counseled on smoking cessation and healthy lifestyle modifications including regular exercise routine, mediterranean diet and blood sugar control.       Changes today: none    Follow up plan:     Follow up with Dr. Perkins in October as scheduled with fasting lipid panel prior         History of Presenting Illness:    Lencho Echevarria is a very pleasant 62 year old male with a history of coronary artery disease, hypertension, mixed hyperlipidemia, tobacco use, DM,  "COPD and former ETOH abuse.    In 2011 patient had PCI to LAD with ZACH x 1. In 2015 he had PCI to RCA with ZACH x 2 and at this time was noted to have  of his diagonal and 30% in-stent restenosis of his LAD stent with 80% apical LAD stenosis.     Patient saw Dr. Perkins in April at which time he was overall doing well, recommended a nuclear stress test and fasting lipid panel.      Nuclear stress test showed 4/28/23 showed small area of nontransmural infarction in the mid to distal anterior segments of the LV associated with mild degree of audra-infarct ischemia. This is consistent with his known anatomy of a  of his diagonal. Normal LVEF.     Patient is here today for cardiology follow up.     Patient reports feeling good. Biggest complaint today is sinus congestion, which has resulted in a cough. Denies chest pain or chest tightness. Denies shortness of breath. Denies exertional dyspnea. Denies orthopnea or PND. Denies dizziness, lightheadedness or other presyncopal symptoms. Denies tachycardia or palpitations. Taking medications daily. Notes mistake on his insulin, ran out of refills.     Labs from May showed stable kidney function and electrolytes. Creatinine 0.91. Total cholesterol 145, HDL 40, LDL 54 and triglycerides 255 (seconday to elevated blood sugar). Blood pressure 134/82 and HR 81 in clinic today.    Appetite good, \"too good\", eating most meals at home, \"I do all the cooking\". Walking outside a few times a week, notes he walks to visit his granddaughter. Denies alcohol use, \"gave that up years ago\". Smoking 1ppd. Notes he has had 3 family deaths.         Social History    3 children   Social History     Socioeconomic History    Marital status:      Spouse name: Not on file    Number of children: Not on file    Years of education: Not on file    Highest education level: Not on file   Occupational History    Not on file   Tobacco Use    Smoking status: Every Day     Packs/day: 1.00     Types: " "Cigarettes    Smokeless tobacco: Never   Vaping Use    Vaping status: Not on file   Substance and Sexual Activity    Alcohol use: No    Drug use: Yes     Frequency: 2.0 times per week     Types: Marijuana    Sexual activity: Not on file   Other Topics Concern    Not on file   Social History Narrative    Not on file     Social Determinants of Health     Financial Resource Strain: Not on file   Food Insecurity: Not on file   Transportation Needs: Not on file   Physical Activity: Not on file   Stress: Not on file   Social Connections: Not on file   Intimate Partner Violence: Not on file   Housing Stability: Not on file          Review of Systems:   Skin:  not assessed     Eyes:  not assessed    ENT:  not assessed sinus trouble  Respiratory:  Positive for cough  Cardiovascular:    fatigue;lightheadedness;dizziness;Positive for  Gastroenterology: not assessed    Genitourinary:  not assessed    Musculoskeletal:  not assessed    Neurologic:  not assessed    Psychiatric:  not assessed    Heme/Lymph/Imm:  not assessed    Endocrine:  not assessed           Physical Exam:   Vitals: /82 (BP Location: Right arm, Patient Position: Sitting, Cuff Size: Adult Large)   Pulse 81   Ht 1.778 m (5' 10\")   Wt 123.3 kg (271 lb 14.4 oz)   SpO2 96%   BMI 39.01 kg/m     Wt Readings from Last 4 Encounters:   06/02/23 123.3 kg (271 lb 14.4 oz)   04/21/23 121.9 kg (268 lb 11.2 oz)   12/14/16 124.8 kg (275 lb 3.2 oz)   10/24/16 126.6 kg (279 lb)     GEN: well nourished, in no acute distress.  HEENT:  Pupils equal, round. Sclerae nonicteric.   NECK: Supple, no masses appreciated.   C/V:  Regular rate and rhythm, no murmur, rub or gallop.    RESP: Respirations are unlabored. Clear to auscultation bilaterally without wheezing, rales, or rhonchi.  GI: Abdomen soft, obese, nontender.  EXTREM: No LE edema.  NEURO: Alert and oriented, cooperative.  SKIN: Warm and dry       Data:     LIPID RESULTS:  Lab Results   Component Value Date    CHOL " 145 05/01/2023    CHOL 217 (H) 07/12/2015    HDL 40 05/01/2023    HDL 32 (L) 07/12/2015    LDL 54 05/01/2023     06/02/2017    LDL  07/12/2015     Cannot estimate LDL when triglyceride exceeds 400 mg/dL    TRIG 255 (H) 05/01/2023    TRIG 557 (H) 07/12/2015    CHOLHDLRATIO 6.8 (H) 07/12/2015     LIVER ENZYME RESULTS:  Lab Results   Component Value Date    ALT 16 05/01/2023     CBC RESULTS:  Lab Results   Component Value Date    WBC 10.0 07/13/2015    RBC 5.18 07/13/2015    HGB 15.2 07/13/2015    HCT 44.9 07/13/2015    MCV 87 07/13/2015    MCH 29.3 07/13/2015    MCHC 33.9 07/13/2015    RDW 13.3 07/13/2015     07/13/2015     BMP RESULTS:  Lab Results   Component Value Date     (L) 05/01/2023     07/13/2015    POTASSIUM 4.2 05/01/2023    POTASSIUM 4.0 07/13/2015    CHLORIDE 98 05/01/2023    CHLORIDE 106 07/13/2015    CO2 27 05/01/2023    CO2 24 07/13/2015    ANIONGAP 10 05/01/2023    ANIONGAP 8 07/13/2015     (H) 05/01/2023     (H) 07/13/2015    BUN 17.3 05/01/2023    BUN 14 07/13/2015    CR 0.91 05/01/2023    CR 0.92 07/13/2015    GFRESTIMATED >90 05/01/2023    GFRESTIMATED 85 07/13/2015    GFRESTBLACK >90   GFR Calc   07/13/2015    SHIVANI 7.7 (L) 05/01/2023    SHIVANI 8.0 (L) 07/13/2015      A1C RESULTS:  Lab Results   Component Value Date    A1C 8.4 (H) 12/13/2016    A1C 10.1 (H) 07/11/2015     INR RESULTS:  Lab Results   Component Value Date    INR 0.90 09/24/2012    INR 0.95 08/28/2011          Medications     Current Outpatient Medications   Medication Sig Dispense Refill    amLODIPine (NORVASC) 10 MG tablet Take 1 tablet (10 mg) by mouth daily      aspirin EC 81 MG EC tablet Take 1 tablet (81 mg) by mouth daily 30 tablet 5    atorvastatin (LIPITOR) 80 MG tablet Take 1 tablet (80 mg) by mouth daily      clonazePAM (KLONOPIN) 0.5 MG tablet Take 0.5 tablets (0.25 mg) by mouth nightly as needed for anxiety      clopidogrel (PLAVIX) 75 MG tablet Take 1 tablet (75 mg) by  mouth daily      coenzyme Q-10 200 MG CAPS capsule Take 1 capsule (200 mg) by mouth      fluticasone-vilanterol (BREO ELLIPTA) 100-25 MCG/ACT inhaler Inhale 1 puff into the lungs daily      insulin aspart (NOVOLOG PEN) 100 UNIT/ML soln Inject 8 Units Subcutaneous 3 times daily (with meals) 3 Month 2    insulin aspart prot & aspart (NOVOLOG MIX 70/30 FLEXPEN) 100 UNITS/ML susp Inject 25 Units Subcutaneous every morning      insulin aspart prot & aspart (NOVOLOG MIX 70/30 FLEXPEN) 100 UNITS/ML susp Inject 30 Units Subcutaneous At Bedtime      insulin detemir (LEVEMIR PEN) 100 UNIT/ML pen Inject 40 Units Subcutaneous At Bedtime      lisinopril-hydrochlorothiazide (ZESTORETIC) 20-12.5 MG tablet Take 1 tablet by mouth daily      metoprolol tartrate (LOPRESSOR) 100 MG tablet Take 1 tablet (100 mg) by mouth 2 times daily      nitroglycerin (NITROSTAT) 0.4 MG SL tablet Place 1 tablet (0.4 mg) under the tongue every 5 minutes as needed for chest pain (Patient not taking: Reported on 6/2/2023) 25 tablet 0        Past Medical History     Past Medical History:   Diagnosis Date    CAD (coronary artery disease)     DM (diabetes mellitus), type 2 (H)     HTN (hypertension)     Hypercholesterolemia     Hyperlipidemia     Myocardial infarction (H)     07-10-15    Tobacco abuse      Past Surgical History:   Procedure Laterality Date    ANGIOGRAM  07-11-15    2 vessel coronary artery disease (diagonal-, apical LAD, and culprit mid to distal RCA). Successful PCI of culprit mid to distal RCA with placement of a 3.5 x 12 mm and 3.5 x 38 mm Promus drug-eluting stents     CARDIAC CATHETERIZATION      CARDIAC CATHETERIZATION  11/04/2016    no interventions    CARDIAC SURGERY      stent    CARDIAC SURGERY      CORONARY ANGIOPLASTY      CORONARY STENT PLACEMENT  2011    LAD stent, total 3 stents    HERNIA REPAIR      HERNIA REPAIR      KNEE SURGERY      X 2    KNEE SURGERY      NEPHRECTOMY Right 12/12/2016    Procedure: ROBOTIC ASSISTED  RIGHT NEPHRECTOMY WITH INTRA OPERATIVE ULTRASOUND;  Surgeon: Makenna Miller MD;  Location: [facility] [place];  Service:      Family History   Problem Relation Age of Onset    Other Cancer Father     Hypertension Mother     Other Cancer Mother     Kidney Disease Sister     Pacemaker Mother     Cancer Mother     Cancer Father     Atrial fibrillation Brother     Acute Myocardial Infarction No family hx of             Allergies   Amoxicillin    30 minutes spent on the date of the encounter doing chart review, history and exam, documentation and further activities as noted above      CARLOS Estrada CNP  [facility]  Pager: [phone]        Thank you for allowing me to participate in the care of your patient.      Sincerely,     CARLOS Estrada CNP     [facility]  cc:   Dejon Perkins MD  [address]  [place] [address]

## 2023-06-02 NOTE — PATIENT INSTRUCTIONS
No medication changes  Follow up with Dr. Perkins as scheduled in October   Please call with any questions/concerns [phone]

## 2023-06-02 NOTE — PROGRESS NOTES
Cardiology Clinic Progress Note  Lencho Echevarria MRN# [id number]   YOB: 1960 Age: 62 year old   Primary Cardiologist: Dr. Perkins  Reason for visit: Cardiology follow up            Assessment and Plan:   Lencho Echevarria is a very pleasant 62 year old male here for cardiology follow up.     1. Coronary artery disease - PCI to LAD with ZACH x 1 in 2011, PCI to RCA with ZACH x 2 in 2015 and at this time was noted to have  of his diagonal and 30% in-stent restenosis of his LAD stent with 80% apical LAD stenosis.    Continue aspirin, plavix, atorvastatin and betablocker  2. Hypertension - controlled  3. Mixed hyperlipidemia - lipid panel 5/2023 showed total cholesterol 145, HDL 40, LDL 54 and triglycerides 255.    - Continue atorvastatin   4. Tobacco use - pre-contemplative, smoking 1ppd, counseled on smoking cessation  5. DM - insulin dependent  6. Obesity      I saw patient today for follow up after cardiac testing. Nuclear stress test showed 4/28/23 showed small area of nontransmural infarction in the mid to distal anterior segments of the LV associated with mild degree of audra-infarct ischemia. This is consistent with his known anatomy of a  of his diagonal. Normal LVEF.     Counseled on smoking cessation and healthy lifestyle modifications including regular exercise routine, mediterranean diet and blood sugar control.       Changes today: none    Follow up plan:     Follow up with Dr. Perkins in October as scheduled with fasting lipid panel prior         History of Presenting Illness:    Lencho Echevarria is a very pleasant 62 year old male with a history of coronary artery disease, hypertension, mixed hyperlipidemia, tobacco use, DM, COPD and former ETOH abuse.    In 2011 patient had PCI to LAD with ZACH x 1. In 2015 he had PCI to RCA with ZACH x 2 and at this time was noted to have  of his diagonal and 30% in-stent restenosis of his LAD stent with 80% apical LAD stenosis.     Patient saw  "Dr. Perkins in April at which time he was overall doing well, recommended a nuclear stress test and fasting lipid panel.      Nuclear stress test showed 4/28/23 showed small area of nontransmural infarction in the mid to distal anterior segments of the LV associated with mild degree of audra-infarct ischemia. This is consistent with his known anatomy of a  of his diagonal. Normal LVEF.     Patient is here today for cardiology follow up.     Patient reports feeling good. Biggest complaint today is sinus congestion, which has resulted in a cough. Denies chest pain or chest tightness. Denies shortness of breath. Denies exertional dyspnea. Denies orthopnea or PND. Denies dizziness, lightheadedness or other presyncopal symptoms. Denies tachycardia or palpitations. Taking medications daily. Notes mistake on his insulin, ran out of refills.     Labs from May showed stable kidney function and electrolytes. Creatinine 0.91. Total cholesterol 145, HDL 40, LDL 54 and triglycerides 255 (seconday to elevated blood sugar). Blood pressure 134/82 and HR 81 in clinic today.    Appetite good, \"too good\", eating most meals at home, \"I do all the cooking\". Walking outside a few times a week, notes he walks to visit his granddaughter. Denies alcohol use, \"gave that up years ago\". Smoking 1ppd. Notes he has had 3 family deaths.         Social History    3 children   Social History     Socioeconomic History     Marital status:      Spouse name: Not on file     Number of children: Not on file     Years of education: Not on file     Highest education level: Not on file   Occupational History     Not on file   Tobacco Use     Smoking status: Every Day     Packs/day: 1.00     Types: Cigarettes     Smokeless tobacco: Never   Vaping Use     Vaping status: Not on file   Substance and Sexual Activity     Alcohol use: No     Drug use: Yes     Frequency: 2.0 times per week     Types: Marijuana     Sexual activity: Not on file   Other " "Topics Concern     Not on file   Social History Narrative     Not on file     Social Determinants of Health     Financial Resource Strain: Not on file   Food Insecurity: Not on file   Transportation Needs: Not on file   Physical Activity: Not on file   Stress: Not on file   Social Connections: Not on file   Intimate Partner Violence: Not on file   Housing Stability: Not on file          Review of Systems:   Skin:  not assessed     Eyes:  not assessed    ENT:  not assessed sinus trouble  Respiratory:  Positive for cough  Cardiovascular:    fatigue;lightheadedness;dizziness;Positive for  Gastroenterology: not assessed    Genitourinary:  not assessed    Musculoskeletal:  not assessed    Neurologic:  not assessed    Psychiatric:  not assessed    Heme/Lymph/Imm:  not assessed    Endocrine:  not assessed           Physical Exam:   Vitals: /82 (BP Location: Right arm, Patient Position: Sitting, Cuff Size: Adult Large)   Pulse 81   Ht 1.778 m (5' 10\")   Wt 123.3 kg (271 lb 14.4 oz)   SpO2 96%   BMI 39.01 kg/m     Wt Readings from Last 4 Encounters:   06/02/23 123.3 kg (271 lb 14.4 oz)   04/21/23 121.9 kg (268 lb 11.2 oz)   12/14/16 124.8 kg (275 lb 3.2 oz)   10/24/16 126.6 kg (279 lb)     GEN: well nourished, in no acute distress.  HEENT:  Pupils equal, round. Sclerae nonicteric.   NECK: Supple, no masses appreciated.   C/V:  Regular rate and rhythm, no murmur, rub or gallop.    RESP: Respirations are unlabored. Clear to auscultation bilaterally without wheezing, rales, or rhonchi.  GI: Abdomen soft, obese, nontender.  EXTREM: No LE edema.  NEURO: Alert and oriented, cooperative.  SKIN: Warm and dry       Data:     LIPID RESULTS:  Lab Results   Component Value Date    CHOL 145 05/01/2023    CHOL 217 (H) 07/12/2015    HDL 40 05/01/2023    HDL 32 (L) 07/12/2015    LDL 54 05/01/2023     06/02/2017    LDL  07/12/2015     Cannot estimate LDL when triglyceride exceeds 400 mg/dL    TRIG 255 (H) 05/01/2023    TRIG " 557 (H) 07/12/2015    CHOLHDLRATIO 6.8 (H) 07/12/2015     LIVER ENZYME RESULTS:  Lab Results   Component Value Date    ALT 16 05/01/2023     CBC RESULTS:  Lab Results   Component Value Date    WBC 10.0 07/13/2015    RBC 5.18 07/13/2015    HGB 15.2 07/13/2015    HCT 44.9 07/13/2015    MCV 87 07/13/2015    MCH 29.3 07/13/2015    MCHC 33.9 07/13/2015    RDW 13.3 07/13/2015     07/13/2015     BMP RESULTS:  Lab Results   Component Value Date     (L) 05/01/2023     07/13/2015    POTASSIUM 4.2 05/01/2023    POTASSIUM 4.0 07/13/2015    CHLORIDE 98 05/01/2023    CHLORIDE 106 07/13/2015    CO2 27 05/01/2023    CO2 24 07/13/2015    ANIONGAP 10 05/01/2023    ANIONGAP 8 07/13/2015     (H) 05/01/2023     (H) 07/13/2015    BUN 17.3 05/01/2023    BUN 14 07/13/2015    CR 0.91 05/01/2023    CR 0.92 07/13/2015    GFRESTIMATED >90 05/01/2023    GFRESTIMATED 85 07/13/2015    GFRESTBLACK >90   GFR Calc   07/13/2015    SHIVANI 7.7 (L) 05/01/2023    SHIVANI 8.0 (L) 07/13/2015      A1C RESULTS:  Lab Results   Component Value Date    A1C 8.4 (H) 12/13/2016    A1C 10.1 (H) 07/11/2015     INR RESULTS:  Lab Results   Component Value Date    INR 0.90 09/24/2012    INR 0.95 08/28/2011          Medications     Current Outpatient Medications   Medication Sig Dispense Refill     amLODIPine (NORVASC) 10 MG tablet Take 1 tablet (10 mg) by mouth daily       aspirin EC 81 MG EC tablet Take 1 tablet (81 mg) by mouth daily 30 tablet 5     atorvastatin (LIPITOR) 80 MG tablet Take 1 tablet (80 mg) by mouth daily       clonazePAM (KLONOPIN) 0.5 MG tablet Take 0.5 tablets (0.25 mg) by mouth nightly as needed for anxiety       clopidogrel (PLAVIX) 75 MG tablet Take 1 tablet (75 mg) by mouth daily       coenzyme Q-10 200 MG CAPS capsule Take 1 capsule (200 mg) by mouth       fluticasone-vilanterol (BREO ELLIPTA) 100-25 MCG/ACT inhaler Inhale 1 puff into the lungs daily       insulin aspart (NOVOLOG PEN) 100 UNIT/ML soln  Inject 8 Units Subcutaneous 3 times daily (with meals) 3 Month 2     insulin aspart prot & aspart (NOVOLOG MIX 70/30 FLEXPEN) 100 UNITS/ML susp Inject 25 Units Subcutaneous every morning       insulin aspart prot & aspart (NOVOLOG MIX 70/30 FLEXPEN) 100 UNITS/ML susp Inject 30 Units Subcutaneous At Bedtime       insulin detemir (LEVEMIR PEN) 100 UNIT/ML pen Inject 40 Units Subcutaneous At Bedtime       lisinopril-hydrochlorothiazide (ZESTORETIC) 20-12.5 MG tablet Take 1 tablet by mouth daily       metoprolol tartrate (LOPRESSOR) 100 MG tablet Take 1 tablet (100 mg) by mouth 2 times daily       nitroglycerin (NITROSTAT) 0.4 MG SL tablet Place 1 tablet (0.4 mg) under the tongue every 5 minutes as needed for chest pain (Patient not taking: Reported on 6/2/2023) 25 tablet 0        Past Medical History     Past Medical History:   Diagnosis Date     CAD (coronary artery disease)      DM (diabetes mellitus), type 2 (H)      HTN (hypertension)      Hypercholesterolemia      Hyperlipidemia      Myocardial infarction (H)     07-10-15     Tobacco abuse      Past Surgical History:   Procedure Laterality Date     ANGIOGRAM  07-11-15    2 vessel coronary artery disease (diagonal-, apical LAD, and culprit mid to distal RCA). Successful PCI of culprit mid to distal RCA with placement of a 3.5 x 12 mm and 3.5 x 38 mm Promus drug-eluting stents      CARDIAC CATHETERIZATION       CARDIAC CATHETERIZATION  11/04/2016    no interventions     CARDIAC SURGERY      stent     CARDIAC SURGERY       CORONARY ANGIOPLASTY       CORONARY STENT PLACEMENT  2011    LAD stent, total 3 stents     HERNIA REPAIR       HERNIA REPAIR       KNEE SURGERY      X 2     KNEE SURGERY       NEPHRECTOMY Right 12/12/2016    Procedure: ROBOTIC ASSISTED RIGHT NEPHRECTOMY WITH INTRA OPERATIVE ULTRASOUND;  Surgeon: Makenna Miller MD;  Location: [facility] [place];  Service:      Family History   Problem Relation Age of Onset     Other Cancer Father       Hypertension Mother      Other Cancer Mother      Kidney Disease Sister      Pacemaker Mother      Cancer Mother      Cancer Father      Atrial fibrillation Brother      Acute Myocardial Infarction No family hx of             Allergies   Amoxicillin    30 minutes spent on the date of the encounter doing chart review, history and exam, documentation and further activities as noted above      CARLOS Estrada [facility]  Pager: [phone]

## 2023-08-19 ENCOUNTER — HEALTH MAINTENANCE LETTER (OUTPATIENT)
Age: 63
End: 2023-08-19

## 2023-09-14 ENCOUNTER — TRANSFERRED RECORDS (OUTPATIENT)
Dept: HEALTH INFORMATION MANAGEMENT | Facility: CLINIC | Age: 63
End: 2023-09-14
Payer: COMMERCIAL

## 2023-09-15 ENCOUNTER — TRANSFERRED RECORDS (OUTPATIENT)
Dept: HEALTH INFORMATION MANAGEMENT | Facility: CLINIC | Age: 63
End: 2023-09-15
Payer: COMMERCIAL

## 2023-10-09 ENCOUNTER — LAB (OUTPATIENT)
Dept: LAB | Facility: CLINIC | Age: 63
End: 2023-10-09
Payer: COMMERCIAL

## 2023-10-09 DIAGNOSIS — I25.10 CORONARY ARTERY DISEASE INVOLVING NATIVE CORONARY ARTERY OF NATIVE HEART WITHOUT ANGINA PECTORIS: ICD-10-CM

## 2023-10-09 LAB
ALT SERPL W P-5'-P-CCNC: 12 U/L (ref 0–70)
ANION GAP SERPL CALCULATED.3IONS-SCNC: 12 MMOL/L (ref 7–15)
BUN SERPL-MCNC: 17.8 MG/DL (ref 8–23)
CALCIUM SERPL-MCNC: 8.5 MG/DL (ref 8.8–10.2)
CHLORIDE SERPL-SCNC: 103 MMOL/L (ref 98–107)
CHOLEST SERPL-MCNC: 132 MG/DL
CREAT SERPL-MCNC: 1.15 MG/DL (ref 0.67–1.17)
DEPRECATED HCO3 PLAS-SCNC: 28 MMOL/L (ref 22–29)
EGFRCR SERPLBLD CKD-EPI 2021: 71 ML/MIN/1.73M2
GLUCOSE SERPL-MCNC: 187 MG/DL (ref 70–99)
HDLC SERPL-MCNC: 38 MG/DL
LDLC SERPL CALC-MCNC: 29 MG/DL
NONHDLC SERPL-MCNC: 94 MG/DL
POTASSIUM SERPL-SCNC: 4 MMOL/L (ref 3.4–5.3)
SODIUM SERPL-SCNC: 143 MMOL/L (ref 135–145)
TRIGL SERPL-MCNC: 326 MG/DL

## 2023-10-09 PROCEDURE — 36415 COLL VENOUS BLD VENIPUNCTURE: CPT | Performed by: INTERNAL MEDICINE

## 2023-10-09 PROCEDURE — 80048 BASIC METABOLIC PNL TOTAL CA: CPT | Performed by: INTERNAL MEDICINE

## 2023-10-09 PROCEDURE — 80061 LIPID PANEL: CPT | Performed by: INTERNAL MEDICINE

## 2023-10-09 PROCEDURE — 84460 ALANINE AMINO (ALT) (SGPT): CPT | Performed by: INTERNAL MEDICINE

## 2023-10-10 ENCOUNTER — OFFICE VISIT (OUTPATIENT)
Dept: CARDIOLOGY | Facility: CLINIC | Age: 63
End: 2023-10-10
Attending: INTERNAL MEDICINE
Payer: COMMERCIAL

## 2023-10-10 VITALS
HEIGHT: 70 IN | SYSTOLIC BLOOD PRESSURE: 128 MMHG | WEIGHT: 263 LBS | OXYGEN SATURATION: 96 % | DIASTOLIC BLOOD PRESSURE: 74 MMHG | BODY MASS INDEX: 37.65 KG/M2 | HEART RATE: 78 BPM

## 2023-10-10 DIAGNOSIS — E78.2 MIXED HYPERLIPIDEMIA: ICD-10-CM

## 2023-10-10 DIAGNOSIS — Z79.4 TYPE 2 DIABETES MELLITUS WITHOUT COMPLICATION, WITH LONG-TERM CURRENT USE OF INSULIN (H): ICD-10-CM

## 2023-10-10 DIAGNOSIS — E88.810 METABOLIC SYNDROME X: ICD-10-CM

## 2023-10-10 DIAGNOSIS — I25.10 CORONARY ARTERY DISEASE INVOLVING NATIVE CORONARY ARTERY OF NATIVE HEART WITHOUT ANGINA PECTORIS: ICD-10-CM

## 2023-10-10 DIAGNOSIS — I21.4 NSTEMI (NON-ST ELEVATED MYOCARDIAL INFARCTION) (H): ICD-10-CM

## 2023-10-10 DIAGNOSIS — E11.9 TYPE 2 DIABETES MELLITUS WITHOUT COMPLICATION, WITH LONG-TERM CURRENT USE OF INSULIN (H): ICD-10-CM

## 2023-10-10 DIAGNOSIS — E78.6 HDL DEFICIENCY: ICD-10-CM

## 2023-10-10 DIAGNOSIS — R60.0 PERIPHERAL EDEMA: ICD-10-CM

## 2023-10-10 DIAGNOSIS — I10 ESSENTIAL HYPERTENSION: ICD-10-CM

## 2023-10-10 DIAGNOSIS — Z72.0 TOBACCO ABUSE: ICD-10-CM

## 2023-10-10 PROCEDURE — 99214 OFFICE O/P EST MOD 30 MIN: CPT | Performed by: INTERNAL MEDICINE

## 2023-10-10 RX ORDER — BUPROPION HYDROCHLORIDE 150 MG/1
TABLET ORAL
COMMUNITY
Start: 2023-08-30

## 2023-10-10 RX ORDER — EMPAGLIFLOZIN 10 MG/1
TABLET, FILM COATED ORAL
COMMUNITY
Start: 2023-08-10

## 2023-10-10 RX ORDER — ALBUTEROL SULFATE 90 UG/1
AEROSOL, METERED RESPIRATORY (INHALATION)
COMMUNITY

## 2023-10-10 RX ORDER — ALPRAZOLAM 0.5 MG
0.5 TABLET ORAL DAILY PRN
COMMUNITY

## 2023-10-10 RX ORDER — FUROSEMIDE 20 MG
1 TABLET ORAL
COMMUNITY
Start: 2022-11-03

## 2023-10-10 RX ORDER — ESCITALOPRAM OXALATE 10 MG/1
TABLET ORAL
COMMUNITY
Start: 2023-08-08 | End: 2023-10-10

## 2023-10-10 NOTE — LETTER
10/10/2023    Jasmin Orlando MD  [facility] [address] [facility] [address]    RE: Lencho Echevarria       Dear Colleague,     I had the pleasure of seeing Lencho Echevarria in the [facility].  HPI and Plan:   Mr. Chilel is a 62-year-old gentleman with a past medical history significant for obesity, mixed hyperlipidemia, with marked hypertriglyceridemia and HDL deficiency, diabetes mellitus, hypertension, coronary disease, metabolic syndrome, COPD with ongoing tobacco abuse 3/4 pack/day, former EtOH abuse.       Coronary history is significant for stent being placed to his left anterior descending artery in 2011 and 2 stents placed in his right coronary artery in 2015 at which time he was noted to have a  of his diagonal and a 30% in-stent restenosis of his LAD stent with 80% apical LAD stenosis.  Care everywhere shows an echocardiogram in 2018 describing an ejection fraction of 65 to 70% without focal wall motion abnormality.       Lencho has no chest arm neck jaw or shoulders comfort.  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  He unfortunately does not follow any specific diet.  He states he has stopped alcohol but unfortunately still smoking 3/4 pack of cigarettes per day.    We did a Lexiscan in April of this year which demonstrated ejection fraction of 58% with an apparent small area of nontransmural infarct in the mid to distal anterior wall of the left anterior descending artery with mild degree of audra-infarct ischemia.  This is consistent with his known occluded collateralized diagonal.     Assessment and plan.  Lencho has no symptoms at this time to suggest ischemia.  This is supported by his Lexiscan from earlier this year demonstrating at most a small amount of ischemia.  We will continue with aggressive risk  factor modification and medical management.     He has no symptoms to suggest heart failure or significant arrhythmia.     Blood pressure is well controlled at 128/74 with a pulse of 78.  Weight is unfortunately 263 pounds.  We talked about the importance of regular exercise regimen and trying to lose weight.  We talked about the importance of going to the gym twice a week and doing resistance activity to build up his muscle mass.  This will help with blood pressure control, diabetes control and improve his lipid profile.     We talked about the importance of stopping all tobacco.  He states he is slowly cutting back.  When I saw him 6 months ago he was smoking 1 pack/day.     Fasting lipid profile is outstanding except for his triglycerides of 326.  Total cholesterol is 132, HDL 38 and LDL is 29.  Again we talked about the importance of a low carbohydrate diet regular exercise including resistance activity and weight loss.  I will defer to Dr. Orlando guarding his diabetic management.  I do see that he is on Jardiance.     Reviewed all of his medications and updated them.  According to his list he is on both aspirin and Plavix.  Although he thinks he is off Plavix.  I have asked him to check.  If he is still on both I would continue Plavix and stop aspirin.  If he is already off the Plavix I will continue with aspirin only.       I have recommended follow-up in 1 year.  We will see him sooner if he should have any issues.  He is okay to proceed with surgery.  Thank you for allow me to participate in this patient's care.  Sincerely,                                Dejon Perkins MD [facility]    Today's clinic visit entailed:  Review of the result(s) of each unique test - lab work, Lexiscan  Ordering of each unique test  Prescription drug management  31 minutes spent by me on the date of the encounter doing chart review, history and exam, documentation and further activities per the note  Provider  Link to [facility] Help  Grid     The level of medical decision making during this visit was of moderate complexity.      Orders Placed This Encounter   Procedures    Basic metabolic panel    Lipid Profile    ALT    Follow-Up with Cardiology       Orders Placed This Encounter   Medications    JARDIANCE 10 MG TABS tablet     Sig: TAKE 1 TAB BY MOUTH EVERY MORNING    furosemide (LASIX) 20 MG tablet     Sig: Take 1 tablet by mouth daily at 2 pm    DISCONTD: escitalopram (LEXAPRO) 10 MG tablet     Sig: TAKE 0.5 TABS DAILY FOR 1 WK AND THEN INCREASES TO 1 TAB BY MOUTH DAILY    buPROPion (WELLBUTRIN XL) 150 MG 24 hr tablet     Sig: TAKE 1 TAB BY MOUTH EVERY MORNING    ALPRAZolam (XANAX) 0.5 MG tablet     Sig: Take 0.5 mg by mouth daily as needed    albuterol (PROAIR HFA/PROVENTIL HFA/VENTOLIN HFA) 108 (90 Base) MCG/ACT inhaler     Sig: INHALE 2 PUFFS EVERY 4 HOURS AS NEEDED FOR WHEEZE OR FOR SHORTNESS OF BREATH       Medications Discontinued During This Encounter   Medication Reason    insulin aspart prot & aspart (NOVOLOG MIX 70/30 FLEXPEN) 100 UNITS/ML susp Erroneous Entry (No AVS)    insulin aspart prot & aspart (NOVOLOG MIX 70/30 FLEXPEN) 100 UNITS/ML susp Erroneous Entry (No AVS)    clopidogrel (PLAVIX) 75 MG tablet     escitalopram (LEXAPRO) 10 MG tablet          Encounter Diagnoses   Name Primary?    Coronary artery disease involving native coronary artery of native heart without angina pectoris     NSTEMI (non-ST elevated myocardial infarction) (H)     HDL deficiency     Tobacco abuse     Type 2 diabetes mellitus without complication, with long-term current use of insulin (H)     Metabolic syndrome X     Mixed hyperlipidemia     Essential hypertension     Peripheral edema        CURRENT MEDICATIONS:  Current Outpatient Medications   Medication Sig Dispense Refill    albuterol (PROAIR HFA/PROVENTIL HFA/VENTOLIN HFA) 108 (90 Base) MCG/ACT inhaler INHALE 2 PUFFS EVERY 4 HOURS AS NEEDED FOR WHEEZE OR FOR SHORTNESS OF BREATH      ALPRAZolam  (XANAX) 0.5 MG tablet Take 0.5 mg by mouth daily as needed      amLODIPine (NORVASC) 10 MG tablet Take 1 tablet (10 mg) by mouth daily      aspirin EC 81 MG EC tablet Take 1 tablet (81 mg) by mouth daily 30 tablet 5    atorvastatin (LIPITOR) 80 MG tablet Take 1 tablet (80 mg) by mouth daily      buPROPion (WELLBUTRIN XL) 150 MG 24 hr tablet TAKE 1 TAB BY MOUTH EVERY MORNING      clonazePAM (KLONOPIN) 0.5 MG tablet Take 0.5 tablets (0.25 mg) by mouth nightly as needed for anxiety      coenzyme Q-10 200 MG CAPS capsule Take 1 capsule (200 mg) by mouth      fluticasone-vilanterol (BREO ELLIPTA) 100-25 MCG/ACT inhaler Inhale 1 puff into the lungs daily      furosemide (LASIX) 20 MG tablet Take 1 tablet by mouth daily at 2 pm      insulin aspart (NOVOLOG PEN) 100 UNIT/ML soln Inject 8 Units Subcutaneous 3 times daily (with meals) (Patient taking differently: Inject 20 Units Subcutaneous 3 times daily (with meals)) 3 Month 2    insulin detemir (LEVEMIR PEN) 100 UNIT/ML pen Inject 40 Units Subcutaneous At Bedtime      JARDIANCE 10 MG TABS tablet TAKE 1 TAB BY MOUTH EVERY MORNING      lisinopril-hydrochlorothiazide (ZESTORETIC) 20-12.5 MG tablet Take 1 tablet by mouth daily      metoprolol tartrate (LOPRESSOR) 100 MG tablet Take 1 tablet (100 mg) by mouth 2 times daily      nitroglycerin (NITROSTAT) 0.4 MG SL tablet Place 1 tablet (0.4 mg) under the tongue every 5 minutes as needed for chest pain 25 tablet 0       ALLERGIES     Allergies   Allergen Reactions    Amoxicillin        PAST MEDICAL HISTORY:  Past Medical History:   Diagnosis Date    CAD (coronary artery disease)     DM (diabetes mellitus), type 2 (H)     HTN (hypertension)     Hypercholesterolemia     Hyperlipidemia     Myocardial infarction (H)     07-10-15    Tobacco abuse        PAST SURGICAL HISTORY:  Past Surgical History:   Procedure Laterality Date    ANGIOGRAM  07-11-15    2 vessel coronary artery disease (diagonal-, apical LAD, and culprit mid to  "distal RCA). Successful PCI of culprit mid to distal RCA with placement of a 3.5 x 12 mm and 3.5 x 38 mm Promus drug-eluting stents     CARDIAC CATHETERIZATION      CARDIAC CATHETERIZATION  11/04/2016    no interventions    CARDIAC SURGERY      stent    CARDIAC SURGERY      CORONARY ANGIOPLASTY      CORONARY STENT PLACEMENT  2011    LAD stent, total 3 stents    HERNIA REPAIR      HERNIA REPAIR      KNEE SURGERY      X 2    KNEE SURGERY      NEPHRECTOMY Right 12/12/2016    Procedure: ROBOTIC ASSISTED RIGHT NEPHRECTOMY WITH INTRA OPERATIVE ULTRASOUND;  Surgeon: Makenna Miller MD;  Location: [facility];  Service:        FAMILY HISTORY:  Family History   Problem Relation Age of Onset    Other Cancer Father     Hypertension Mother     Other Cancer Mother     Kidney Disease Sister     Pacemaker Mother     Cancer Mother     Cancer Father     Atrial fibrillation Brother     Acute Myocardial Infarction No family hx of        SOCIAL HISTORY:  Social History     Socioeconomic History    Marital status:      Spouse name: None    Number of children: None    Years of education: None    Highest education level: None   Tobacco Use    Smoking status: Every Day     Packs/day: 0.75     Types: Cigarettes    Smokeless tobacco: Never   Substance and Sexual Activity    Alcohol use: No    Drug use: Yes     Frequency: 2.0 times per week     Types: Marijuana       Review of Systems:  Skin:          Eyes:         ENT:         Respiratory:  Positive for dyspnea on exertion     Cardiovascular:    Positive for;lightheadedness    Gastroenterology:        Genitourinary:         Musculoskeletal:         Neurologic:         Psychiatric:         Heme/Lymph/Imm:         Endocrine:           Physical Exam:  Vitals: /74 (BP Location: Right arm, Patient Position: Sitting, Cuff Size: Adult Large)   Pulse 78   Ht 1.778 m (5' 10\")   Wt 119.3 kg (263 lb)   SpO2 96%   BMI 37.74 kg/m      Constitutional:  cooperative, alert " and oriented, well developed, well nourished, in no acute distress obese      Skin:  warm and dry to the touch, no apparent skin lesions or masses noted          Head:  normocephalic, no masses or lesions        Eyes:  pupils equal and round, conjunctivae and lids unremarkable, sclera white, no xanthalasma, EOMS intact, no nystagmus        Lymph:      ENT:  no pallor or cyanosis, dentition good        Neck:  no carotid bruit        Respiratory:  normal breath sounds, clear to auscultation, normal A-P diameter, normal symmetry, normal respiratory excursion, no use of accessory muscles         Cardiac: regular rhythm;no murmurs, gallops or rubs detected                pulses full and equal                                        GI:           Extremities and Muscular Skeletal:  no edema;no spinal abnormalities noted;normal muscle strength and tone              Neurological:  no gross motor deficits        Psych:  affect appropriate, oriented to time, person and place        CC  Dejon Perkins MD  [address]  [place] [address]        Thank you for allowing me to participate in the care of your patient.      Sincerely,     Dejon Perkins MD     [facility]

## 2023-10-10 NOTE — PROGRESS NOTES
HPI and Plan:   Mr. Chilel is a 62-year-old gentleman with a past medical history significant for obesity, mixed hyperlipidemia, with marked hypertriglyceridemia and HDL deficiency, diabetes mellitus, hypertension, coronary disease, metabolic syndrome, COPD with ongoing tobacco abuse 3/4 pack/day, former EtOH abuse.       Coronary history is significant for stent being placed to his left anterior descending artery in 2011 and 2 stents placed in his right coronary artery in 2015 at which time he was noted to have a  of his diagonal and a 30% in-stent restenosis of his LAD stent with 80% apical LAD stenosis.  Care everywhere shows an echocardiogram in 2018 describing an ejection fraction of 65 to 70% without focal wall motion abnormality.       Lencho has no chest arm neck jaw or shoulders comfort.  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  He unfortunately does not follow any specific diet.  He states he has stopped alcohol but unfortunately still smoking 3/4 pack of cigarettes per day.    We did a Lexiscan in April of this year which demonstrated ejection fraction of 58% with an apparent small area of nontransmural infarct in the mid to distal anterior wall of the left anterior descending artery with mild degree of audra-infarct ischemia.  This is consistent with his known occluded collateralized diagonal.     Assessment and plan.  Lencho has no symptoms at this time to suggest ischemia.  This is supported by his Lexiscan from earlier this year demonstrating at most a small amount of ischemia.  We will continue with aggressive risk factor modification and medical management.     He has no symptoms to suggest heart failure or significant arrhythmia.     Blood pressure is well controlled at 128/74 with a pulse of 78.  Weight is unfortunately 263 pounds.  We talked about the importance of  regular exercise regimen and trying to lose weight.  We talked about the importance of going to the gym twice a week and doing resistance activity to build up his muscle mass.  This will help with blood pressure control, diabetes control and improve his lipid profile.     We talked about the importance of stopping all tobacco.  He states he is slowly cutting back.  When I saw him 6 months ago he was smoking 1 pack/day.     Fasting lipid profile is outstanding except for his triglycerides of 326.  Total cholesterol is 132, HDL 38 and LDL is 29.  Again we talked about the importance of a low carbohydrate diet regular exercise including resistance activity and weight loss.  I will defer to Dr. Orlando guarding his diabetic management.  I do see that he is on Jardiance.     Reviewed all of his medications and updated them.  According to his list he is on both aspirin and Plavix.  Although he thinks he is off Plavix.  I have asked him to check.  If he is still on both I would continue Plavix and stop aspirin.  If he is already off the Plavix I will continue with aspirin only.       I have recommended follow-up in 1 year.  We will see him sooner if he should have any issues.  He is okay to proceed with surgery.  Thank you for allow me to participate in this patient's care.  Sincerely,                                Dejon Perkins MD [facility]    Today's clinic visit entailed:  Review of the result(s) of each unique test - lab work, Lexiscan  Ordering of each unique test  Prescription drug management  31 minutes spent by me on the date of the encounter doing chart review, history and exam, documentation and further activities per the note  Provider  Link to [facility] Help Grid     The level of medical decision making during this visit was of moderate complexity.      Orders Placed This Encounter   Procedures    Basic metabolic panel    Lipid Profile    ALT    Follow-Up with Cardiology       Orders Placed This Encounter    Medications    JARDIANCE 10 MG TABS tablet     Sig: TAKE 1 TAB BY MOUTH EVERY MORNING    furosemide (LASIX) 20 MG tablet     Sig: Take 1 tablet by mouth daily at 2 pm    DISCONTD: escitalopram (LEXAPRO) 10 MG tablet     Sig: TAKE 0.5 TABS DAILY FOR 1 WK AND THEN INCREASES TO 1 TAB BY MOUTH DAILY    buPROPion (WELLBUTRIN XL) 150 MG 24 hr tablet     Sig: TAKE 1 TAB BY MOUTH EVERY MORNING    ALPRAZolam (XANAX) 0.5 MG tablet     Sig: Take 0.5 mg by mouth daily as needed    albuterol (PROAIR HFA/PROVENTIL HFA/VENTOLIN HFA) 108 (90 Base) MCG/ACT inhaler     Sig: INHALE 2 PUFFS EVERY 4 HOURS AS NEEDED FOR WHEEZE OR FOR SHORTNESS OF BREATH       Medications Discontinued During This Encounter   Medication Reason    insulin aspart prot & aspart (NOVOLOG MIX 70/30 FLEXPEN) 100 UNITS/ML susp Erroneous Entry (No AVS)    insulin aspart prot & aspart (NOVOLOG MIX 70/30 FLEXPEN) 100 UNITS/ML susp Erroneous Entry (No AVS)    clopidogrel (PLAVIX) 75 MG tablet     escitalopram (LEXAPRO) 10 MG tablet          Encounter Diagnoses   Name Primary?    Coronary artery disease involving native coronary artery of native heart without angina pectoris     NSTEMI (non-ST elevated myocardial infarction) (H)     HDL deficiency     Tobacco abuse     Type 2 diabetes mellitus without complication, with long-term current use of insulin (H)     Metabolic syndrome X     Mixed hyperlipidemia     Essential hypertension     Peripheral edema        CURRENT MEDICATIONS:  Current Outpatient Medications   Medication Sig Dispense Refill    albuterol (PROAIR HFA/PROVENTIL HFA/VENTOLIN HFA) 108 (90 Base) MCG/ACT inhaler INHALE 2 PUFFS EVERY 4 HOURS AS NEEDED FOR WHEEZE OR FOR SHORTNESS OF BREATH      ALPRAZolam (XANAX) 0.5 MG tablet Take 0.5 mg by mouth daily as needed      amLODIPine (NORVASC) 10 MG tablet Take 1 tablet (10 mg) by mouth daily      aspirin EC 81 MG EC tablet Take 1 tablet (81 mg) by mouth daily 30 tablet 5    atorvastatin (LIPITOR) 80 MG tablet  Take 1 tablet (80 mg) by mouth daily      buPROPion (WELLBUTRIN XL) 150 MG 24 hr tablet TAKE 1 TAB BY MOUTH EVERY MORNING      clonazePAM (KLONOPIN) 0.5 MG tablet Take 0.5 tablets (0.25 mg) by mouth nightly as needed for anxiety      coenzyme Q-10 200 MG CAPS capsule Take 1 capsule (200 mg) by mouth      fluticasone-vilanterol (BREO ELLIPTA) 100-25 MCG/ACT inhaler Inhale 1 puff into the lungs daily      furosemide (LASIX) 20 MG tablet Take 1 tablet by mouth daily at 2 pm      insulin aspart (NOVOLOG PEN) 100 UNIT/ML soln Inject 8 Units Subcutaneous 3 times daily (with meals) (Patient taking differently: Inject 20 Units Subcutaneous 3 times daily (with meals)) 3 Month 2    insulin detemir (LEVEMIR PEN) 100 UNIT/ML pen Inject 40 Units Subcutaneous At Bedtime      JARDIANCE 10 MG TABS tablet TAKE 1 TAB BY MOUTH EVERY MORNING      lisinopril-hydrochlorothiazide (ZESTORETIC) 20-12.5 MG tablet Take 1 tablet by mouth daily      metoprolol tartrate (LOPRESSOR) 100 MG tablet Take 1 tablet (100 mg) by mouth 2 times daily      nitroglycerin (NITROSTAT) 0.4 MG SL tablet Place 1 tablet (0.4 mg) under the tongue every 5 minutes as needed for chest pain 25 tablet 0       ALLERGIES     Allergies   Allergen Reactions    Amoxicillin        PAST MEDICAL HISTORY:  Past Medical History:   Diagnosis Date    CAD (coronary artery disease)     DM (diabetes mellitus), type 2 (H)     HTN (hypertension)     Hypercholesterolemia     Hyperlipidemia     Myocardial infarction (H)     07-10-15    Tobacco abuse        PAST SURGICAL HISTORY:  Past Surgical History:   Procedure Laterality Date    ANGIOGRAM  07-11-15    2 vessel coronary artery disease (diagonal-, apical LAD, and culprit mid to distal RCA). Successful PCI of culprit mid to distal RCA with placement of a 3.5 x 12 mm and 3.5 x 38 mm Promus drug-eluting stents     CARDIAC CATHETERIZATION      CARDIAC CATHETERIZATION  11/04/2016    no interventions    CARDIAC SURGERY      stent     "CARDIAC SURGERY      CORONARY ANGIOPLASTY      CORONARY STENT PLACEMENT  2011    LAD stent, total 3 stents    HERNIA REPAIR      HERNIA REPAIR      KNEE SURGERY      X 2    KNEE SURGERY      NEPHRECTOMY Right 12/12/2016    Procedure: ROBOTIC ASSISTED RIGHT NEPHRECTOMY WITH INTRA OPERATIVE ULTRASOUND;  Surgeon: Makenna Miller MD;  [facility];  Service:        FAMILY HISTORY:  Family History   Problem Relation Age of Onset    Other Cancer Father     Hypertension Mother     Other Cancer Mother     Kidney Disease Sister     Pacemaker Mother     Cancer Mother     Cancer Father     Atrial fibrillation Brother     Acute Myocardial Infarction No family hx of        SOCIAL HISTORY:  Social History     Socioeconomic History    Marital status:      Spouse name: None    Number of children: None    Years of education: None    Highest education level: None   Tobacco Use    Smoking status: Every Day     Packs/day: 0.75     Types: Cigarettes    Smokeless tobacco: Never   Substance and Sexual Activity    Alcohol use: No    Drug use: Yes     Frequency: 2.0 times per week     Types: Marijuana       Review of Systems:  Skin:          Eyes:         ENT:         Respiratory:  Positive for dyspnea on exertion     Cardiovascular:    Positive for;lightheadedness    Gastroenterology:        Genitourinary:         Musculoskeletal:         Neurologic:         Psychiatric:         Heme/Lymph/Imm:         Endocrine:           Physical Exam:  Vitals: /74 (BP Location: Right arm, Patient Position: Sitting, Cuff Size: Adult Large)   Pulse 78   Ht 1.778 m (5' 10\")   Wt 119.3 kg (263 lb)   SpO2 96%   BMI 37.74 kg/m      Constitutional:  cooperative, alert and oriented, well developed, well nourished, in no acute distress obese      Skin:  warm and dry to the touch, no apparent skin lesions or masses noted          Head:  normocephalic, no masses or lesions        Eyes:  pupils equal and round, conjunctivae " and lids unremarkable, sclera white, no xanthalasma, EOMS intact, no nystagmus        Lymph:      ENT:  no pallor or cyanosis, dentition good        Neck:  no carotid bruit        Respiratory:  normal breath sounds, clear to auscultation, normal A-P diameter, normal symmetry, normal respiratory excursion, no use of accessory muscles         Cardiac: regular rhythm;no murmurs, gallops or rubs detected                pulses full and equal                                        GI:           Extremities and Muscular Skeletal:  no edema;no spinal abnormalities noted;normal muscle strength and tone              Neurological:  no gross motor deficits        Psych:  affect appropriate, oriented to time, person and place        CC  Dejon Perkins MD  [address]  [place]  [place] [address]

## 2024-01-06 ENCOUNTER — HEALTH MAINTENANCE LETTER (OUTPATIENT)
Age: 64
End: 2024-01-06

## 2024-01-29 ENCOUNTER — TELEPHONE (OUTPATIENT)
Dept: CARDIOLOGY | Facility: CLINIC | Age: 64
End: 2024-01-29
Payer: COMMERCIAL

## 2024-01-29 NOTE — TELEPHONE ENCOUNTER
[facility] Call Center    Phone Message    May a detailed message be left on voicemail: yes     Reason for Call: Other: pt called in with questions about hios blood thinners, please call pt back for further discussion.      Action Taken: Other: cardiology    Travel Screening: Not Applicable    Thank you!  [facility]

## 2024-01-30 NOTE — TELEPHONE ENCOUNTER
Patient states  he is currently on both Plavix and ASA 81 mg daily. And wants to stop the plavix.  Denies any symptoms of chest discomfort. Shortness of breath, dizziness.   Patient will continue ASA 81 mg daily.  Reminded of upcoming Annual OV in Oct 2024.    Plavix was already removed from medication list.      Next Dr. Perkins OV annual 10/2024.     Last Dr. Perkins OV 10-10-23  Reviewed all of his medications and updated them.  According to his list he is on both aspirin and Plavix.  Although he thinks he is off Plavix.  I have asked him to check.  If he is still on both I would continue Plavix and stop aspirin.  If he is already off the Plavix I will continue with aspirin only.

## 2024-03-16 ENCOUNTER — HEALTH MAINTENANCE LETTER (OUTPATIENT)
Age: 64
End: 2024-03-16

## 2024-05-25 ENCOUNTER — HEALTH MAINTENANCE LETTER (OUTPATIENT)
Age: 64
End: 2024-05-25

## 2024-10-07 ENCOUNTER — TRANSFERRED RECORDS (OUTPATIENT)
Dept: HEALTH INFORMATION MANAGEMENT | Facility: CLINIC | Age: 64
End: 2024-10-07

## 2024-10-07 ENCOUNTER — HOSPITAL ENCOUNTER (OUTPATIENT)
Facility: CLINIC | Age: 64
Setting detail: OBSERVATION
Discharge: HOME | End: 2024-10-09
Attending: EMERGENCY MEDICINE | Admitting: STUDENT IN AN ORGANIZED HEALTH CARE EDUCATION/TRAINING PROGRAM
Payer: COMMERCIAL

## 2024-10-07 DIAGNOSIS — K92.2 UGIB (UPPER GASTROINTESTINAL BLEED): ICD-10-CM

## 2024-10-07 DIAGNOSIS — D62 ANEMIA DUE TO BLOOD LOSS, ACUTE: ICD-10-CM

## 2024-10-07 LAB
ABO/RH(D): NORMAL
ALBUMIN SERPL BCG-MCNC: 3.8 G/DL (ref 3.5–5.2)
ALP SERPL-CCNC: 84 U/L (ref 40–150)
ALT SERPL W P-5'-P-CCNC: 17 U/L (ref 0–70)
ANION GAP SERPL CALCULATED.3IONS-SCNC: 15 MMOL/L (ref 7–15)
ANTIBODY SCREEN: NEGATIVE
AST SERPL W P-5'-P-CCNC: 17 U/L (ref 0–45)
BASOPHILS # BLD AUTO: 0.1 10E3/UL (ref 0–0.2)
BASOPHILS NFR BLD AUTO: 0 %
BILIRUB SERPL-MCNC: 0.2 MG/DL
BUN SERPL-MCNC: 30.5 MG/DL (ref 8–23)
CALCIUM SERPL-MCNC: 7.5 MG/DL (ref 8.8–10.4)
CHLORIDE SERPL-SCNC: 101 MMOL/L (ref 98–107)
CREAT SERPL-MCNC: 1.26 MG/DL (ref 0.67–1.17)
EGFRCR SERPLBLD CKD-EPI 2021: 63 ML/MIN/1.73M2
EOSINOPHIL # BLD AUTO: 0.2 10E3/UL (ref 0–0.7)
EOSINOPHIL NFR BLD AUTO: 1 %
ERYTHROCYTE [DISTWIDTH] IN BLOOD BY AUTOMATED COUNT: 15.2 % (ref 10–15)
EST. AVERAGE GLUCOSE BLD GHB EST-MCNC: 203 MG/DL
ETHANOL SERPL-MCNC: <0.01 G/DL
GLUCOSE SERPL-MCNC: 221 MG/DL (ref 70–99)
HBA1C MFR BLD: 8.7 %
HCO3 SERPL-SCNC: 24 MMOL/L (ref 22–29)
HCT VFR BLD AUTO: 27.4 % (ref 40–53)
HEMOCCULT STL QL: POSITIVE
HGB BLD-MCNC: 8.7 G/DL (ref 13.3–17.7)
HOLD SPECIMEN: NORMAL
IMM GRANULOCYTES # BLD: 0.4 10E3/UL
IMM GRANULOCYTES NFR BLD: 2 %
INR PPP: 0.95 (ref 0.85–1.15)
LYMPHOCYTES # BLD AUTO: 3.4 10E3/UL (ref 0.8–5.3)
LYMPHOCYTES NFR BLD AUTO: 20 %
MCH RBC QN AUTO: 30.2 PG (ref 26.5–33)
MCHC RBC AUTO-ENTMCNC: 31.8 G/DL (ref 31.5–36.5)
MCV RBC AUTO: 95 FL (ref 78–100)
MONOCYTES # BLD AUTO: 1 10E3/UL (ref 0–1.3)
MONOCYTES NFR BLD AUTO: 6 %
NEUTROPHILS # BLD AUTO: 11.7 10E3/UL (ref 1.6–8.3)
NEUTROPHILS NFR BLD AUTO: 70 %
NRBC # BLD AUTO: 0 10E3/UL
NRBC BLD AUTO-RTO: 0 /100
PLATELET # BLD AUTO: 295 10E3/UL (ref 150–450)
POTASSIUM SERPL-SCNC: 4.2 MMOL/L (ref 3.4–5.3)
PROT SERPL-MCNC: 5.7 G/DL (ref 6.4–8.3)
RBC # BLD AUTO: 2.88 10E6/UL (ref 4.4–5.9)
SODIUM SERPL-SCNC: 140 MMOL/L (ref 135–145)
SPECIMEN EXPIRATION DATE: NORMAL
WBC # BLD AUTO: 16.6 10E3/UL (ref 4–11)

## 2024-10-07 PROCEDURE — 85025 COMPLETE CBC W/AUTO DIFF WBC: CPT | Performed by: EMERGENCY MEDICINE

## 2024-10-07 PROCEDURE — 82272 OCCULT BLD FECES 1-3 TESTS: CPT | Performed by: EMERGENCY MEDICINE

## 2024-10-07 PROCEDURE — 82040 ASSAY OF SERUM ALBUMIN: CPT | Performed by: EMERGENCY MEDICINE

## 2024-10-07 PROCEDURE — 250N000009 HC RX 250: Performed by: EMERGENCY MEDICINE

## 2024-10-07 PROCEDURE — 83036 HEMOGLOBIN GLYCOSYLATED A1C: CPT | Performed by: INTERNAL MEDICINE

## 2024-10-07 PROCEDURE — 36415 COLL VENOUS BLD VENIPUNCTURE: CPT | Performed by: EMERGENCY MEDICINE

## 2024-10-07 PROCEDURE — 82077 ASSAY SPEC XCP UR&BREATH IA: CPT | Performed by: EMERGENCY MEDICINE

## 2024-10-07 PROCEDURE — 86900 BLOOD TYPING SEROLOGIC ABO: CPT | Performed by: EMERGENCY MEDICINE

## 2024-10-07 PROCEDURE — 85610 PROTHROMBIN TIME: CPT | Performed by: EMERGENCY MEDICINE

## 2024-10-07 PROCEDURE — 96374 THER/PROPH/DIAG INJ IV PUSH: CPT

## 2024-10-07 PROCEDURE — 120N000001 HC R&B MED SURG/OB

## 2024-10-07 PROCEDURE — 99222 1ST HOSP IP/OBS MODERATE 55: CPT | Performed by: INTERNAL MEDICINE

## 2024-10-07 PROCEDURE — 99285 EMERGENCY DEPT VISIT HI MDM: CPT | Mod: 25

## 2024-10-07 RX ORDER — ALPRAZOLAM 0.5 MG
0.5 TABLET ORAL
Status: DISCONTINUED | OUTPATIENT
Start: 2024-10-08 | End: 2024-10-09 | Stop reason: HOSPADM

## 2024-10-07 RX ORDER — METOPROLOL TARTRATE 50 MG
100 TABLET ORAL 2 TIMES DAILY
Status: DISCONTINUED | OUTPATIENT
Start: 2024-10-07 | End: 2024-10-09 | Stop reason: HOSPADM

## 2024-10-07 RX ORDER — ACETAMINOPHEN 325 MG/1
650 TABLET ORAL EVERY 4 HOURS PRN
Status: DISCONTINUED | OUTPATIENT
Start: 2024-10-07 | End: 2024-10-09 | Stop reason: HOSPADM

## 2024-10-07 RX ORDER — AMOXICILLIN 250 MG
1 CAPSULE ORAL 2 TIMES DAILY PRN
Status: DISCONTINUED | OUTPATIENT
Start: 2024-10-07 | End: 2024-10-09 | Stop reason: HOSPADM

## 2024-10-07 RX ORDER — LIDOCAINE 40 MG/G
CREAM TOPICAL
Status: DISCONTINUED | OUTPATIENT
Start: 2024-10-07 | End: 2024-10-09 | Stop reason: HOSPADM

## 2024-10-07 RX ORDER — SODIUM CHLORIDE, SODIUM LACTATE, POTASSIUM CHLORIDE, CALCIUM CHLORIDE 600; 310; 30; 20 MG/100ML; MG/100ML; MG/100ML; MG/100ML
INJECTION, SOLUTION INTRAVENOUS CONTINUOUS
Status: DISCONTINUED | OUTPATIENT
Start: 2024-10-08 | End: 2024-10-08

## 2024-10-07 RX ORDER — NICOTINE POLACRILEX 4 MG
15-30 LOZENGE BUCCAL
Status: DISCONTINUED | OUTPATIENT
Start: 2024-10-07 | End: 2024-10-09 | Stop reason: HOSPADM

## 2024-10-07 RX ORDER — DEXTROSE MONOHYDRATE 25 G/50ML
25-50 INJECTION, SOLUTION INTRAVENOUS
Status: DISCONTINUED | OUTPATIENT
Start: 2024-10-07 | End: 2024-10-09 | Stop reason: HOSPADM

## 2024-10-07 RX ORDER — ACETAMINOPHEN 650 MG/1
650 SUPPOSITORY RECTAL EVERY 4 HOURS PRN
Status: DISCONTINUED | OUTPATIENT
Start: 2024-10-07 | End: 2024-10-09 | Stop reason: HOSPADM

## 2024-10-07 RX ORDER — HYDROMORPHONE HCL IN WATER/PF 6 MG/30 ML
0.4 PATIENT CONTROLLED ANALGESIA SYRINGE INTRAVENOUS
Status: DISCONTINUED | OUTPATIENT
Start: 2024-10-07 | End: 2024-10-09 | Stop reason: HOSPADM

## 2024-10-07 RX ORDER — AMOXICILLIN 250 MG
2 CAPSULE ORAL 2 TIMES DAILY PRN
Status: DISCONTINUED | OUTPATIENT
Start: 2024-10-07 | End: 2024-10-09 | Stop reason: HOSPADM

## 2024-10-07 RX ORDER — CALCIUM CARBONATE 500 MG/1
1000 TABLET, CHEWABLE ORAL 4 TIMES DAILY PRN
Status: DISCONTINUED | OUTPATIENT
Start: 2024-10-07 | End: 2024-10-09 | Stop reason: HOSPADM

## 2024-10-07 RX ORDER — ONDANSETRON 2 MG/ML
4 INJECTION INTRAMUSCULAR; INTRAVENOUS EVERY 6 HOURS PRN
Status: DISCONTINUED | OUTPATIENT
Start: 2024-10-07 | End: 2024-10-09 | Stop reason: HOSPADM

## 2024-10-07 RX ORDER — HYDROMORPHONE HCL IN WATER/PF 6 MG/30 ML
0.2 PATIENT CONTROLLED ANALGESIA SYRINGE INTRAVENOUS
Status: DISCONTINUED | OUTPATIENT
Start: 2024-10-07 | End: 2024-10-09 | Stop reason: HOSPADM

## 2024-10-07 RX ORDER — ONDANSETRON 4 MG/1
4 TABLET, ORALLY DISINTEGRATING ORAL EVERY 6 HOURS PRN
Status: DISCONTINUED | OUTPATIENT
Start: 2024-10-07 | End: 2024-10-09 | Stop reason: HOSPADM

## 2024-10-07 RX ADMIN — PANTOPRAZOLE SODIUM 40 MG: 40 INJECTION, POWDER, FOR SOLUTION INTRAVENOUS at 19:50

## 2024-10-07 ASSESSMENT — COLUMBIA-SUICIDE SEVERITY RATING SCALE - C-SSRS
2. HAVE YOU ACTUALLY HAD ANY THOUGHTS OF KILLING YOURSELF IN THE PAST MONTH?: NO
6. HAVE YOU EVER DONE ANYTHING, STARTED TO DO ANYTHING, OR PREPARED TO DO ANYTHING TO END YOUR LIFE?: NO
1. IN THE PAST MONTH, HAVE YOU WISHED YOU WERE DEAD OR WISHED YOU COULD GO TO SLEEP AND NOT WAKE UP?: NO

## 2024-10-07 ASSESSMENT — ACTIVITIES OF DAILY LIVING (ADL)
ADLS_ACUITY_SCORE: 35

## 2024-10-08 LAB
ANION GAP SERPL CALCULATED.3IONS-SCNC: 10 MMOL/L (ref 7–15)
BUN SERPL-MCNC: 27.9 MG/DL (ref 8–23)
CALCIUM SERPL-MCNC: 7.5 MG/DL (ref 8.8–10.4)
CHLORIDE SERPL-SCNC: 106 MMOL/L (ref 98–107)
CREAT SERPL-MCNC: 1.2 MG/DL (ref 0.67–1.17)
EGFRCR SERPLBLD CKD-EPI 2021: 67 ML/MIN/1.73M2
ERYTHROCYTE [DISTWIDTH] IN BLOOD BY AUTOMATED COUNT: 15.5 % (ref 10–15)
GLUCOSE BLDC GLUCOMTR-MCNC: 120 MG/DL (ref 70–99)
GLUCOSE BLDC GLUCOMTR-MCNC: 130 MG/DL (ref 70–99)
GLUCOSE BLDC GLUCOMTR-MCNC: 136 MG/DL (ref 70–99)
GLUCOSE BLDC GLUCOMTR-MCNC: 142 MG/DL (ref 70–99)
GLUCOSE BLDC GLUCOMTR-MCNC: 159 MG/DL (ref 70–99)
GLUCOSE BLDC GLUCOMTR-MCNC: 164 MG/DL (ref 70–99)
GLUCOSE BLDC GLUCOMTR-MCNC: 169 MG/DL (ref 70–99)
GLUCOSE SERPL-MCNC: 125 MG/DL (ref 70–99)
HCO3 SERPL-SCNC: 25 MMOL/L (ref 22–29)
HCT VFR BLD AUTO: 23.5 % (ref 40–53)
HGB BLD-MCNC: 7.5 G/DL (ref 13.3–17.7)
HGB BLD-MCNC: 7.8 G/DL (ref 13.3–17.7)
MCH RBC QN AUTO: 30 PG (ref 26.5–33)
MCHC RBC AUTO-ENTMCNC: 31.9 G/DL (ref 31.5–36.5)
MCV RBC AUTO: 94 FL (ref 78–100)
PLATELET # BLD AUTO: 248 10E3/UL (ref 150–450)
POTASSIUM SERPL-SCNC: 3.8 MMOL/L (ref 3.4–5.3)
RBC # BLD AUTO: 2.5 10E6/UL (ref 4.4–5.9)
SODIUM SERPL-SCNC: 141 MMOL/L (ref 135–145)
UPPER GI ENDOSCOPY: NORMAL
WBC # BLD AUTO: 13.3 10E3/UL (ref 4–11)

## 2024-10-08 PROCEDURE — 96376 TX/PRO/DX INJ SAME DRUG ADON: CPT | Mod: 59

## 2024-10-08 PROCEDURE — 88305 TISSUE EXAM BY PATHOLOGIST: CPT | Mod: 26 | Performed by: PATHOLOGY

## 2024-10-08 PROCEDURE — 80048 BASIC METABOLIC PNL TOTAL CA: CPT | Performed by: INTERNAL MEDICINE

## 2024-10-08 PROCEDURE — 36415 COLL VENOUS BLD VENIPUNCTURE: CPT | Performed by: INTERNAL MEDICINE

## 2024-10-08 PROCEDURE — 250N000011 HC RX IP 250 OP 636: Performed by: INTERNAL MEDICINE

## 2024-10-08 PROCEDURE — G0378 HOSPITAL OBSERVATION PER HR: HCPCS

## 2024-10-08 PROCEDURE — 36415 COLL VENOUS BLD VENIPUNCTURE: CPT | Performed by: STUDENT IN AN ORGANIZED HEALTH CARE EDUCATION/TRAINING PROGRAM

## 2024-10-08 PROCEDURE — 250N000009 HC RX 250: Performed by: INTERNAL MEDICINE

## 2024-10-08 PROCEDURE — 258N000003 HC RX IP 258 OP 636: Performed by: INTERNAL MEDICINE

## 2024-10-08 PROCEDURE — 43239 EGD BIOPSY SINGLE/MULTIPLE: CPT | Performed by: INTERNAL MEDICINE

## 2024-10-08 PROCEDURE — 250N000013 HC RX MED GY IP 250 OP 250 PS 637: Performed by: INTERNAL MEDICINE

## 2024-10-08 PROCEDURE — 96376 TX/PRO/DX INJ SAME DRUG ADON: CPT

## 2024-10-08 PROCEDURE — 250N000012 HC RX MED GY IP 250 OP 636 PS 637: Performed by: INTERNAL MEDICINE

## 2024-10-08 PROCEDURE — 85018 HEMOGLOBIN: CPT | Performed by: STUDENT IN AN ORGANIZED HEALTH CARE EDUCATION/TRAINING PROGRAM

## 2024-10-08 PROCEDURE — 85027 COMPLETE CBC AUTOMATED: CPT | Performed by: INTERNAL MEDICINE

## 2024-10-08 PROCEDURE — 99233 SBSQ HOSP IP/OBS HIGH 50: CPT | Performed by: STUDENT IN AN ORGANIZED HEALTH CARE EDUCATION/TRAINING PROGRAM

## 2024-10-08 PROCEDURE — G0500 MOD SEDAT ENDO SERVICE >5YRS: HCPCS | Performed by: INTERNAL MEDICINE

## 2024-10-08 PROCEDURE — 88305 TISSUE EXAM BY PATHOLOGIST: CPT | Mod: TC | Performed by: INTERNAL MEDICINE

## 2024-10-08 PROCEDURE — 82962 GLUCOSE BLOOD TEST: CPT

## 2024-10-08 RX ORDER — FLUTICASONE PROPIONATE AND SALMETEROL 250; 50 UG/1; UG/1
1 POWDER RESPIRATORY (INHALATION) 2 TIMES DAILY
COMMUNITY
Start: 2024-09-26

## 2024-10-08 RX ORDER — LIDOCAINE 40 MG/G
CREAM TOPICAL
Status: DISCONTINUED | OUTPATIENT
Start: 2024-10-08 | End: 2024-10-08 | Stop reason: HOSPADM

## 2024-10-08 RX ORDER — FLUMAZENIL 0.1 MG/ML
0.2 INJECTION, SOLUTION INTRAVENOUS
Status: ACTIVE | OUTPATIENT
Start: 2024-10-08 | End: 2024-10-09

## 2024-10-08 RX ORDER — NALOXONE HYDROCHLORIDE 0.4 MG/ML
0.4 INJECTION, SOLUTION INTRAMUSCULAR; INTRAVENOUS; SUBCUTANEOUS
Status: DISCONTINUED | OUTPATIENT
Start: 2024-10-08 | End: 2024-10-08 | Stop reason: HOSPADM

## 2024-10-08 RX ORDER — NALOXONE HYDROCHLORIDE 0.4 MG/ML
0.4 INJECTION, SOLUTION INTRAMUSCULAR; INTRAVENOUS; SUBCUTANEOUS
Status: DISCONTINUED | OUTPATIENT
Start: 2024-10-08 | End: 2024-10-09 | Stop reason: HOSPADM

## 2024-10-08 RX ORDER — FLUTICASONE FUROATE AND VILANTEROL 100; 25 UG/1; UG/1
1 POWDER RESPIRATORY (INHALATION) DAILY
Status: CANCELLED | OUTPATIENT
Start: 2024-10-08

## 2024-10-08 RX ORDER — NALOXONE HYDROCHLORIDE 0.4 MG/ML
0.2 INJECTION, SOLUTION INTRAMUSCULAR; INTRAVENOUS; SUBCUTANEOUS
Status: DISCONTINUED | OUTPATIENT
Start: 2024-10-08 | End: 2024-10-08 | Stop reason: HOSPADM

## 2024-10-08 RX ORDER — EPINEPHRINE 1 MG/ML
0.1 INJECTION, SOLUTION, CONCENTRATE INTRAVENOUS
Status: DISCONTINUED | OUTPATIENT
Start: 2024-10-08 | End: 2024-10-08 | Stop reason: HOSPADM

## 2024-10-08 RX ORDER — SODIUM CHLORIDE 9 MG/ML
INJECTION, SOLUTION INTRAVENOUS CONTINUOUS
Status: DISCONTINUED | OUTPATIENT
Start: 2024-10-08 | End: 2024-10-08

## 2024-10-08 RX ORDER — INSULIN GLARGINE 100 [IU]/ML
28 INJECTION, SOLUTION SUBCUTANEOUS EVERY EVENING
COMMUNITY
Start: 2024-08-01

## 2024-10-08 RX ORDER — FLUMAZENIL 0.1 MG/ML
0.2 INJECTION, SOLUTION INTRAVENOUS
Status: DISCONTINUED | OUTPATIENT
Start: 2024-10-08 | End: 2024-10-08 | Stop reason: HOSPADM

## 2024-10-08 RX ORDER — FENTANYL CITRATE 50 UG/ML
50-100 INJECTION, SOLUTION INTRAMUSCULAR; INTRAVENOUS EVERY 5 MIN PRN
Status: DISCONTINUED | OUTPATIENT
Start: 2024-10-08 | End: 2024-10-08 | Stop reason: HOSPADM

## 2024-10-08 RX ORDER — SIMETHICONE 40MG/0.6ML
133 SUSPENSION, DROPS(FINAL DOSAGE FORM)(ML) ORAL
Status: DISCONTINUED | OUTPATIENT
Start: 2024-10-08 | End: 2024-10-08 | Stop reason: HOSPADM

## 2024-10-08 RX ORDER — DIPHENHYDRAMINE HYDROCHLORIDE 50 MG/ML
25-50 INJECTION INTRAMUSCULAR; INTRAVENOUS
Status: DISCONTINUED | OUTPATIENT
Start: 2024-10-08 | End: 2024-10-08 | Stop reason: HOSPADM

## 2024-10-08 RX ORDER — NALOXONE HYDROCHLORIDE 0.4 MG/ML
0.2 INJECTION, SOLUTION INTRAMUSCULAR; INTRAVENOUS; SUBCUTANEOUS
Status: DISCONTINUED | OUTPATIENT
Start: 2024-10-08 | End: 2024-10-09 | Stop reason: HOSPADM

## 2024-10-08 RX ORDER — ATROPINE SULFATE 0.1 MG/ML
1 INJECTION INTRAVENOUS
Status: DISCONTINUED | OUTPATIENT
Start: 2024-10-08 | End: 2024-10-08 | Stop reason: HOSPADM

## 2024-10-08 RX ADMIN — SODIUM CHLORIDE, POTASSIUM CHLORIDE, SODIUM LACTATE AND CALCIUM CHLORIDE: 600; 310; 30; 20 INJECTION, SOLUTION INTRAVENOUS at 01:29

## 2024-10-08 RX ADMIN — PANTOPRAZOLE SODIUM 40 MG: 40 INJECTION, POWDER, FOR SOLUTION INTRAVENOUS at 10:13

## 2024-10-08 RX ADMIN — FENTANYL CITRATE 100 MCG: 50 INJECTION, SOLUTION INTRAMUSCULAR; INTRAVENOUS at 13:16

## 2024-10-08 RX ADMIN — ALPRAZOLAM 0.5 MG: 0.5 TABLET ORAL at 22:59

## 2024-10-08 RX ADMIN — INSULIN GLARGINE 20 UNITS: 100 INJECTION, SOLUTION SUBCUTANEOUS at 01:29

## 2024-10-08 RX ADMIN — INSULIN ASPART 1 UNITS: 100 INJECTION, SOLUTION INTRAVENOUS; SUBCUTANEOUS at 18:44

## 2024-10-08 RX ADMIN — ALPRAZOLAM 0.5 MG: 0.5 TABLET ORAL at 01:58

## 2024-10-08 RX ADMIN — MIDAZOLAM 2 MG: 1 INJECTION INTRAMUSCULAR; INTRAVENOUS at 13:16

## 2024-10-08 RX ADMIN — INSULIN GLARGINE 28 UNITS: 100 INJECTION, SOLUTION SUBCUTANEOUS at 22:06

## 2024-10-08 RX ADMIN — EMPAGLIFLOZIN 10 MG: 10 TABLET, FILM COATED ORAL at 11:35

## 2024-10-08 RX ADMIN — INSULIN ASPART 2 UNITS: 100 INJECTION, SOLUTION INTRAVENOUS; SUBCUTANEOUS at 22:07

## 2024-10-08 RX ADMIN — PANTOPRAZOLE SODIUM 40 MG: 40 INJECTION, POWDER, FOR SOLUTION INTRAVENOUS at 22:03

## 2024-10-08 RX ADMIN — METOPROLOL TARTRATE 100 MG: 50 TABLET, FILM COATED ORAL at 11:36

## 2024-10-08 RX ADMIN — METOPROLOL TARTRATE 100 MG: 50 TABLET, FILM COATED ORAL at 22:01

## 2024-10-08 ASSESSMENT — ACTIVITIES OF DAILY LIVING (ADL)
ADLS_ACUITY_SCORE: 23
ADLS_ACUITY_SCORE: 21
ADLS_ACUITY_SCORE: 25
ADLS_ACUITY_SCORE: 27
ADLS_ACUITY_SCORE: 27
ADLS_ACUITY_SCORE: 35
ADLS_ACUITY_SCORE: 21
ADLS_ACUITY_SCORE: 23
ADLS_ACUITY_SCORE: 25
ADLS_ACUITY_SCORE: 27
ADLS_ACUITY_SCORE: 27
ADLS_ACUITY_SCORE: 25
ADLS_ACUITY_SCORE: 25
ADLS_ACUITY_SCORE: 23
ADLS_ACUITY_SCORE: 27
ADLS_ACUITY_SCORE: 25
ADLS_ACUITY_SCORE: 25
ADLS_ACUITY_SCORE: 21
ADLS_ACUITY_SCORE: 27
ADLS_ACUITY_SCORE: 25
ADLS_ACUITY_SCORE: 21

## 2024-10-08 NOTE — H&P
[facility] History and Physical    Lencho Echevarria MRN# [id number]   Age: 63 year old YOB: 1960     Date of Admission:  10/7/2024    Home clinic: [facility]  Primary care provider: Jasmin Orlando          Assessment and Plan:   Assessment:   Lencho Echevarria is a 63 year old man with history of CAD, Obesity, COPD, tobacco abuse and IDDM, type 2 who initially presented to an outside UC for dizziness and epigastric pain.  He came to attention tonight in [facility] ED after having been found to have a hgb 8.7.  A recent Hgb was 17, apparently measured 3 months ago.  He was directed to the ED due to concern for possible UGI Bleed.    On presentation to the ED, VS: HR 80 /67, RR 20 with oxygen saturation 98% breathing room air.  The patient is afebrile.  Examination: Alert, coherent in no apparent distress.  Mr. Chilel is alert but without significant discomfort.  He is fully appropriate to the situation is able to give a full history.  Labs: Creatinine 1.26 with BUN 30.5, calcium 7.5, albumin 3.8.  Other electrolytes and LFTs are normal.  Glucose 221 with HbA1c 8.7.  Stool occult blood is positive.  WBC 16.6 with Hgb 8.7, .  INR 0.95.  BAL less than measured threshold.  Imaging: None.  Interventions in the emergency department: Patient was started on Protonix 40 mg IV twice daily.  I was asked to admit him for further evaluation.    Diagnoses:  Normocytic anemia.  This is a reportedly fairly rapid drop from about 3 months ago.  Patient is on chronic aspirin due to his coronary artery disease and recently was treated for bronchitis with antibiotics and a steroid burst.  It was around the time of the steroid burst that the patient noticed dark stools though these were not frankly melenic.  Insulin-dependent type 2 diabetes.  Overall, patient appears to have fairly good control but reports peripheral neuropathy.  Coronary artery disease status post stenting x 5.  No  current active concerns.  Suspect underlying COPD with history of smoking and recent bronchitis.      Plan:   1.  Admit to inpatient.  2.  N.p.o. for possible EGD tomorrow.  3.  [facility] is consulted.  4.  The patient was typed and screened in the emergency department.  5.  Continue with Lantus insulin at bedtime.  Patient will also be on an insulin sliding scale every 4 hours for n.p.o. status.  6.  Continue with Protonix 40 mg IV twice daily.             Chief Complaint:     Lab abnormalities     History is obtained from the patient, electronic health record, and emergency department physician     Mr. Echevarria reports that over the course the last week he has noticed more dizziness when he bends over or stands up quickly.  He does not otherwise have much in the way of significant shortness of breath and has not had any chest pain.  He has not passed out but does endorse some lightheadedness.    He did not really notice much in the way of stool changes but describes at passing 2 stools today that were very dark though they were formed and fairly firm.  He has not had any trouble with urinating.  No nausea or vomiting.  He has not noticed nausea or worsened epigastric pain with eating.  He has not noticed that he is full faster than previous.  He has not particularly noticed that fatty foods are problematic for him.  He does report passing more gas than usual.        Past Medical History:     Past Medical History:   Diagnosis Date    CAD (coronary artery disease)     DM (diabetes mellitus), type 2 (H)     HTN (hypertension)     Hypercholesterolemia     Hyperlipidemia     Myocardial infarction (H)     07-10-15    Tobacco abuse              Past Surgical History:      Past Surgical History:   Procedure Laterality Date    ANGIOGRAM  07-11-15    2 vessel coronary artery disease (diagonal-, apical LAD, and culprit mid to distal RCA). Successful PCI of culprit mid to distal RCA with placement of a 3.5  x 12 mm and 3.5 x 38 mm Promus drug-eluting stents     CARDIAC CATHETERIZATION      CARDIAC CATHETERIZATION  11/04/2016    no interventions    CARDIAC SURGERY      stent    CARDIAC SURGERY      CORONARY ANGIOPLASTY      CORONARY STENT PLACEMENT  2011    LAD stent, total 3 stents    HERNIA REPAIR      HERNIA REPAIR      KNEE SURGERY      X 2    KNEE SURGERY      NEPHRECTOMY Right 12/12/2016    Procedure: ROBOTIC ASSISTED RIGHT NEPHRECTOMY WITH INTRA OPERATIVE ULTRASOUND;  Surgeon: Makenna Miller MD;  Location: [facility];  Service:              Social History:     Social History     Tobacco Use    Smoking status: Every Day     Current packs/day: 0.75     Types: Cigarettes    Smokeless tobacco: Never   Substance Use Topics    Alcohol use: No             Family History:     Family History   Problem Relation Age of Onset    Other Cancer Father     Hypertension Mother     Other Cancer Mother     Kidney Disease Sister     Pacemaker Mother     Cancer Mother     Cancer Father     Atrial fibrillation Brother     Acute Myocardial Infarction No family hx of      Family history reviewed          Immunizations:     Immunization History   Administered Date(s) Administered    COVID-19 MONOVALENT 12+ (Pfizer) 08/23/2021, 09/15/2021    Influenza (IIV3) PF 09/25/2012             Allergies:     Allergies   Allergen Reactions    Amoxicillin              Medications:   Albuterol inhaler  Alprazolam 0.5 mg daily as needed  Amlodipine 10 mg daily  Aspirin 81 mg daily  Atorvastatin 80 mg daily  Insulin Lantus 28 units in the evening.  Insulin aspart 20 units with each meal  Lisinopril-hydrochlorothiazide 20-12.5 daily  Metoprolol tartrate 100 mg twice daily  Nitroglycerin as needed.         Review of Systems:     A comprehensive review of systems was performed and found to be negative except as described in this note           Physical Exam:   Vitals were reviewed  Temp: 97.5  F (36.4  C)   BP: 110/69 Pulse: 80   Resp: 20  SpO2: 95 %      Constitutional: Awake, alert, cooperative, no apparent distress, and appears stated age.  Eyes: Lids and lashes normal, pupils equal and round, extra ocular muscles intact, sclera clear, conjunctiva normal.  ENT: Normocephalic, without obvious abnormality, atraumatic.  Neck: Supple, symmetrical, trachea midline, no adenopathy, thyroid symmetric, not enlarged and no tenderness, skin normal.  Hematologic / Lymphatic: No cervical lymphadenopathy and no supraclavicular lymphadenopathy.  Lungs: No increased work of breathing, good air exchange, clear to auscultation bilaterally, no crackles or wheezing.  Cardiovascular: Regular rate and rhythm, normal S1 and S2, no S3 or S4, and no murmur noted.  Abdomen: Normal bowel sounds, soft, non-distended, mildly tender in the epigastric and subcostal areas.  No masses palpated, no hepatosplenomegally.  Musculoskeletal: No redness, warmth, or swelling of the joints.  Tone is normal.  Neurologic: Awake, alert, oriented to name, place and time.  Cranial nerves II-XII are grossly intact.   Neuropsychiatric: Normal affect, mood, orientation, memory and insight.  Skin: No rashes, erythema, pallor, petechia or purpura.          Data:     Results for orders placed or performed during the hospital encounter of 10/07/24 (from the past 24 hour(s))   CBC with platelets differential    Narrative    The following orders were created for panel order CBC with platelets differential.  Procedure                               Abnormality         Status                     ---------                               -----------         ------                     CBC with platelets and d...[[id number]]  Abnormal            Final result                 Please view results for these tests on the individual orders.   INR   Result Value Ref Range    INR 0.95 0.85 - 1.15   Comprehensive metabolic panel   Result Value Ref Range    Sodium 140 135 - 145 mmol/L    Potassium 4.2 3.4 - 5.3 mmol/L     Carbon Dioxide (CO2) 24 22 - 29 mmol/L    Anion Gap 15 7 - 15 mmol/L    Urea Nitrogen 30.5 (H) 8.0 - 23.0 mg/dL    Creatinine 1.26 (H) 0.67 - 1.17 mg/dL    GFR Estimate 63 >60 mL/min/1.73m2    Calcium 7.5 (L) 8.8 - 10.4 mg/dL    Chloride 101 98 - 107 mmol/L    Glucose 221 (H) 70 - 99 mg/dL    Alkaline Phosphatase 84 40 - 150 U/L    AST 17 0 - 45 U/L    ALT 17 0 - 70 U/L    Protein Total 5.7 (L) 6.4 - 8.3 g/dL    Albumin 3.8 3.5 - 5.2 g/dL    Bilirubin Total 0.2 <=1.2 mg/dL   Ethyl Alcohol Level   Result Value Ref Range    Alcohol ethyl <0.01 <=0.01 g/dL   ABO/Rh type and screen    Narrative    The following orders were created for panel order ABO/Rh type and screen.  Procedure                               Abnormality         Status                     ---------                               -----------         ------                     Adult Type and Screen[[id number]]                            Final result                 Please view results for these tests on the individual orders.   [place]o Draw    Narrative    The following orders were created for panel order [place] Draw.  Procedure                               Abnormality         Status                     ---------                               -----------         ------                     Extra Red Top Tube[[id number]]                               Final result                 Please view results for these tests on the individual orders.   CBC with platelets and differential   Result Value Ref Range    WBC Count 16.6 (H) 4.0 - 11.0 10e3/uL    RBC Count 2.88 (L) 4.40 - 5.90 10e6/uL    Hemoglobin 8.7 (L) 13.3 - 17.7 g/dL    Hematocrit 27.4 (L) 40.0 - 53.0 %    MCV 95 78 - 100 fL    MCH 30.2 26.5 - 33.0 pg    MCHC 31.8 31.5 - 36.5 g/dL    RDW 15.2 (H) 10.0 - 15.0 %    Platelet Count 295 150 - 450 10e3/uL    % Neutrophils 70 %    % Lymphocytes 20 %    % Monocytes 6 %    % Eosinophils 1 %    % Basophils 0 %    % Immature Granulocytes 2 %    NRBCs per 100 WBC 0  <1 /100    Absolute Neutrophils 11.7 (H) 1.6 - 8.3 10e3/uL    Absolute Lymphocytes 3.4 0.8 - 5.3 10e3/uL    Absolute Monocytes 1.0 0.0 - 1.3 10e3/uL    Absolute Eosinophils 0.2 0.0 - 0.7 10e3/uL    Absolute Basophils 0.1 0.0 - 0.2 10e3/uL    Absolute Immature Granulocytes 0.4 <=0.4 10e3/uL    Absolute NRBCs 0.0 10e3/uL   Adult Type and Screen   Result Value Ref Range    ABO/RH(D) O POS     Antibody Screen Negative Negative    SPECIMEN EXPIRATION DATE [id number]    Extra Red Top Tube   Result Value Ref Range    Hold Specimen [facility]    Hemoglobin A1c   Result Value Ref Range    Estimated Average Glucose 203 (H) <117 mg/dL    Hemoglobin A1C 8.7 (H) <5.7 %   Occult blood stool   Result Value Ref Range    Occult Blood Positive (A) Negative      All cardiac studies reviewed by me.   All imaging studies reviewed by me.      Attestation:  I have reviewed today's vital signs, notes, medications, labs and imaging.     Armando Quiroz MD

## 2024-10-08 NOTE — PROGRESS NOTES
[facility]    Medicine Progress Note - Hospitalist Service    Date of Admission:  10/7/2024    Assessment & Plan   Lencho Echevarria is a 63 year old man with PMH of CAD, obesity, COPD, tobacco abuse, T2DM who was admitted on 10/7/2024 with dizziness, epigastric pain.     He was found to have acute decline in his hemoglobin.  Stool occult positive.  Patient also endorses 1 week of black smelly stools.      Concern for UGIB so GI is consulted.       ABLA  Suspect UGIB  Dizziness & epigastric pain:  Presents with 1 week of black stools.  Also endorsed dizziness and epigastric pain.  Hemoglobin shows an acute decline.  Reportedly had a hemoglobin of 17 3 months ago.  Hemoglobin is now 8.7.  Following morning dropped to 7.5.  Stool occult positive in the ED.  -GI consultation  -N.p.o. until GI recommendations regarding possible procedure  -Pantoprazole IV 40 mg twice daily  -Hemoglobin recheck this afternoon  -Consented for transfusion  -Transfuse for hemoglobin less than 7  -Continue NS at 100 ml/hr  -Close monitoring of vitals    Insulin dependent T2DM:  Normally takes 28 units Lantus every evening, ?20 units 3 times daily with meals.  -Lantus 28 units daily was ordered on admission  -N.p.o. sliding scale ordered  -PTA Jardiance    CAD s/p stenting:  -Hold PTA lisinopril/hydrochlorothiazide, amlodipine, aspirin in the setting of suspected upper GI bleed  -Continue PTA metoprolol with hold parameters     COPD: PTA Advair          Diet: NPO per Anesthesia Guidelines for Procedure/Surgery Except for: Meds    DVT Prophylaxis: Pneumatic Compression Devices  Everett Catheter: Not present  Lines: None     Cardiac Monitoring: None  Code Status: Full Code      Clinically Significant Risk Factors Present on Admission          # Hypocalcemia: Lowest Ca = 7.5 mg/dL in last 2 days, will monitor and replace as appropriate       # Drug Induced Platelet Defect: home medication list includes an antiplatelet  "medication   # Hypertension: Noted on problem list    # Anemia: based on hgb <11  # Anemia: based on hgb <11      # DMII: A1C = 8.7 % (Ref range: <5.7 %) within past 6 months   # Obesity: Estimated body mass index is 37.07 kg/m  as calculated from the following:    Height as of this encounter: 1.778 m (5' 10\").    Weight as of this encounter: 117.2 kg (258 lb 6.1 oz).              Disposition Plan     Medically Ready for Discharge: Anticipated Tomorrow             Ole Light DO  Hospitalist Service  [facility]  Securely message with [employer] (more info)  Text page via [employer] Paging/Directory   ______________________________________________________________________    Interval History   NAEO.  Patient with no complaints.  No further bowel movements.    Physical Exam   Vital Signs: Temp: 98  F (36.7  C) Temp src: Oral BP: 108/58 Pulse: 96   Resp: 18 SpO2: 90 % O2 Device: None (Room air)    Weight: 258 lbs 6.07 oz    General Appearance: Patient awake & alert.  No apparent distress.   Respiratory: Lungs are CTAB.  Work of breathing appears normal on room air.  Cardiovascular: Regular rate and rhythm.  No murmurs rubs or gallops.  There is no edema present.  GI: Benign.  Soft.  Non-tender.  Bowel sounds active.   Skin: No rashes or lesions exposed skin.  Neuro:  The patient is moving all extremities.  No obvious focal asymmetries.   Other: Patient is appropriate and oriented x3.     Medical Decision Making       50 MINUTES SPENT BY ME on the date of service doing chart review, history, exam, documentation & further activities per the note.      Data   ------------------------- PAST 24 HR DATA REVIEWED -----------------------------------------------    I have personally reviewed the following data over the past 24 hrs:    13.3 (H)  \   7.5 (L)   / 248     141 106 27.9 (H) /  130 (H)   3.8 25 1.20 (H) \     ALT: 17 AST: 17 AP: 84 TBILI: 0.2   ALB: 3.8 TOT PROTEIN: 5.7 (L) LIPASE: N/A     TSH: N/A " T4: N/A A1C: 8.7 (H)     INR:  0.95 PTT:  N/A   D-dimer:  N/A Fibrinogen:  N/A       Imaging results reviewed over the past 24 hrs:   No results found for this or any previous visit (from the past 24 hour(s)).

## 2024-10-08 NOTE — CONSULTS
[facility] Chart Update    62 yo with 1 week dark stool, anemia, epigastric pain.  EGD today with mild duodenitis, otherwise normal.  No prior colonoscopy.  Will arrange for outpatient colonoscopy within 2 weeks.  Will follow up on H. Pylori biopsies taken today.  Suggest continued empiric PPI x1 month.  Resume regular diet.  Discharge planning per hospital service.    Dejon Beckham MD  [facility]

## 2024-10-08 NOTE — PLAN OF CARE
"Admit from ER ~0130. Denies pain. Occasionally lightheaded. BP soft but stable. HS lopressor held per parameters. Pale dry skin with scattered scabs, scratches and bruising noted on abdomen- patient states he has two new kittens. Up with 1, walker to BR. Voiding without issue, no BM overnight. IVF infusing. NPO for GI consult today. /120.     Major Shift Events   Admit from ER- oriented to room and POC reviewed, otherwise uneventful night, slept well. No significant change in condition.       Treatment Plan:   NPO, GI consult. Monitor HgB. Assist when OOB until symptoms improve.                         Problem: Adult Inpatient Plan of Care  Goal: Plan of Care Review  Description: The Plan of Care Review/Shift note should be completed every shift.  The Outcome Evaluation is a brief statement about your assessment that the patient is improving, declining, or no change.  This information will be displayed automatically on your shift  note.  Outcome: Progressing  Flowsheets (Taken 10/8/2024 0644)  Plan of Care Reviewed With: patient  Overall Patient Progress: improving  Goal: Patient-Specific Goal (Individualized)  Description: You can add care plan individualizations to a care plan. Examples of Individualization might be:  \"Parent requests to be called daily at 9am for status\", \"I have a hard time hearing out of my right ear\", or \"Do not touch me to wake me up as it startles  me\".  Outcome: Progressing  Goal: Absence of Hospital-Acquired Illness or Injury  Outcome: Progressing  Intervention: Identify and Manage Fall Risk  Recent Flowsheet Documentation  Taken 10/8/2024 0120 by Genet Piña, RN  Safety Promotion/Fall Prevention: safety round/check completed  Intervention: Prevent Skin Injury  Recent Flowsheet Documentation  Taken 10/8/2024 0120 by Genet Piña, RN  Body Position: position changed independently  Goal: Optimal Comfort and Wellbeing  Outcome: Progressing  Goal: Readiness for Transition " of Care  Outcome: Progressing  Intervention: Mutually Develop Transition Plan  Recent Flowsheet Documentation  Taken 10/8/2024 0100 by Genet Piña, RN  Equipment Currently Used at Home: none   Goal Outcome Evaluation:      Plan of Care Reviewed With: patient    Overall Patient Progress: improvingOverall Patient Progress: improving

## 2024-10-08 NOTE — PHARMACY-ADMISSION MEDICATION HISTORY
Pharmacist Admission Medication History    Admission medication history is complete. The information provided in this note is only as accurate as the sources available at the time of the update.    Information Source(s): Patient via in-person, [employer]    Pertinent Information: -    Changes made to PTA medication list:  Added: Advair, insulin Lantus  Deleted: insulin Levemir, bupropion, furosemide  Changed: None    Allergies reviewed with patient and updates made in EHR: unable to assess    Medication History Completed By: Areli eLy [facility] 10/8/2024 10:36 AM    PTA Med List   Medication Sig Last Dose    albuterol (PROAIR HFA/PROVENTIL HFA/VENTOLIN HFA) 108 (90 Base) MCG/ACT inhaler INHALE 2 PUFFS EVERY 4 HOURS AS NEEDED FOR WHEEZE OR FOR SHORTNESS OF BREATH     amLODIPine (NORVASC) 10 MG tablet Take 1 tablet (10 mg) by mouth daily 10/7/2024    aspirin EC 81 MG EC tablet Take 1 tablet (81 mg) by mouth daily 10/7/2024    atorvastatin (LIPITOR) 80 MG tablet Take 1 tablet (80 mg) by mouth daily 10/7/2024    clonazePAM (KLONOPIN) 0.5 MG tablet Take 0.5 tablets (0.25 mg) by mouth nightly as needed for anxiety Unknown    coenzyme Q-10 200 MG CAPS capsule Take 1 capsule (200 mg) by mouth 10/7/2024    fluticasone-salmeterol (ADVAIR) 250-50 MCG/ACT inhaler Inhale 1 puff into the lungs 2 times daily. 10/7/2024    insulin aspart (NOVOLOG PEN) 100 UNIT/ML soln Inject 8 Units Subcutaneous 3 times daily (with meals) (Patient taking differently: Inject 20 Units subcutaneously 3 times daily (with meals).) 10/7/2024    JARDIANCE 10 MG TABS tablet TAKE 1 TAB BY MOUTH EVERY MORNING 10/7/2024    LANTUS SOLOSTAR 100 UNIT/ML soln Inject 28 Units subcutaneously every evening. 10/7/2024 at [facility]    lisinopril-hydrochlorothiazide (ZESTORETIC) 20-12.5 MG tablet Take 1 tablet by mouth daily 10/7/2024    metoprolol tartrate (LOPRESSOR) 100 MG tablet Take 1 tablet (100 mg) by mouth 2 times daily 10/7/2024    nitroglycerin (NITROSTAT)  0.4 MG SL tablet Place 1 tablet (0.4 mg) under the tongue every 5 minutes as needed for chest pain Unknown

## 2024-10-08 NOTE — ED NOTES
[facility]  ED Nurse Handoff Report    ED Chief complaint: Abnormal Labs  . ED Diagnosis:   Final diagnoses:   UGIB (upper gastrointestinal bleed)   Anemia due to blood loss, acute       Allergies:   Allergies   Allergen Reactions    Amoxicillin        Code Status: Full Code    Activity level - Baseline/Home:  independent.  Activity Level - Current:   independent.   Lift room needed: No.   Bariatric: No   Needed: No   Isolation: No.   Infection: Not Applicable.     Respiratory status: Room air    Vital Signs (within 30 minutes):   Vitals:    10/07/24 2013 10/07/24 2028 10/07/24 2043 10/07/24 2058   BP: 113/70 112/69 106/72 110/69   Pulse:  80     Resp:       Temp:       SpO2: 95% 95% 98% 95%   Weight:       Height:           Cardiac Rhythm:  ,      Pain level:    Patient confused: No.   Patient Falls Risk: arm band in place, activity supervised, and toileting schedule implemented.   Elimination Status: Has voided     Patient Report - Initial Complaint:Send for abnormal lab, epigastric pain, positive GI bleed .   Focused Assessment: Lencho Echevarria is a 63 year old male referred from an outside urgent care for further evaluation after presenting there with complaints of epigastric abdominal pain lightheadedness and weakness.  He had testing there that showed a hemoglobin of 8.7.     There is no history of GI bleed.  He has noted about 3 days of black appearing stools.     He was also recently treated with a course of azithromycin and prednisone for a respiratory illness from which she is improved.     Abnormal Results:   Labs Ordered and Resulted from Time of ED Arrival to Time of ED Departure   COMPREHENSIVE METABOLIC PANEL - Abnormal       Result Value    Sodium 140      Potassium 4.2      Carbon Dioxide (CO2) 24      Anion Gap 15      Urea Nitrogen 30.5 (*)     Creatinine 1.26 (*)     GFR Estimate 63      Calcium 7.5 (*)     Chloride 101      Glucose 221 (*)     Alkaline  Phosphatase 84      AST 17      ALT 17      Protein Total 5.7 (*)     Albumin 3.8      Bilirubin Total 0.2     OCCULT BLOOD STOOL - Abnormal    Occult Blood Positive (*)    CBC WITH PLATELETS AND DIFFERENTIAL - Abnormal    WBC Count 16.6 (*)     RBC Count 2.88 (*)     Hemoglobin 8.7 (*)     Hematocrit 27.4 (*)     MCV 95      MCH 30.2      MCHC 31.8      RDW 15.2 (*)     Platelet Count 295      % Neutrophils 70      % Lymphocytes 20      % Monocytes 6      % Eosinophils 1      % Basophils 0      % Immature Granulocytes 2      NRBCs per 100 WBC 0      Absolute Neutrophils 11.7 (*)     Absolute Lymphocytes 3.4      Absolute Monocytes 1.0      Absolute Eosinophils 0.2      Absolute Basophils 0.1      Absolute Immature Granulocytes 0.4      Absolute NRBCs 0.0     INR - Normal    INR 0.95     ETHYL ALCOHOL LEVEL - Normal    Alcohol ethyl <0.01     TYPE AND SCREEN, ADULT    ABO/RH(D) O POS      Antibody Screen Negative      SPECIMEN EXPIRATION DATE [id number]     ABO/RH TYPE AND SCREEN        No orders to display       Treatments provided: See MAR   Family Comments: spouse   OBS brochure/video discussed/provided to patient:  N/A  ED Medications:   Medications   pantoprazole (PROTONIX) IV push injection 40 mg (40 mg Intravenous $Given 10/7/24 1950)       Drips infusing:  No  For the majority of the shift this patient was Green.   Interventions performed were NA .    Sepsis treatment initiated: No    Cares/treatment/interventions/medications to be completed following ED care: NA     ED Nurse Name: Gordon Joe RN  11:04 PM     RECEIVING UNIT ED HANDOFF REVIEW    Above ED Nurse Handoff Report was reviewed: Yes  Reviewed by: Genet Piña RN on October 8, 2024 at 12:45 AM   I Jacinto called the ED to inform them the note was read: No

## 2024-10-08 NOTE — PROGRESS NOTES
10/08/24 0120   Skin   Skin WDL X;integrity   Skin Integrity scab;bruised (ecchymotic)  (scattered scabs and scratching,   bruising)   Bruised (ecchymotic) location   (scattered)     Admitted/transferred from: ER  2 RN full   skin assessment completed by Genet Piña RN and Ariela Sands RN.  Skin assessment finding: other Bruises, scabs, scratches to abdomen & ASHER UE    Interventions/actions: other Lotion to dry areas      Will continue to monitor.

## 2024-10-08 NOTE — ED PROVIDER NOTES
Emergency Department Note      History of Present Illness     Chief Complaint   Abnormal Labs      HPI   Lencho Echevarira is a 63 year old male referred from an outside urgent care for further evaluation after presenting there with complaints of epigastric abdominal pain lightheadedness and weakness.  He had testing there that showed a hemoglobin of 8.7.    There is no history of GI bleed.  He has noted about 3 days of black appearing stools.    He was also recently treated with a course of azithromycin and prednisone for a respiratory illness from which she is improved.    Independent Historian   None    Review of External Notes   I reviewed paperwork from [facility] urgent [facility].  This is not appearing in the EMR/Care Everywhere.  Labs included a white blood cell count 15.76, hemoglobin 8.7, platelets 285.    Past Medical History     Medical History and Problem List   Past Medical History:   Diagnosis Date    CAD (coronary artery disease)     DM (diabetes mellitus), type 2 (H)     HTN (hypertension)     Hypercholesterolemia     Hyperlipidemia     Myocardial infarction (H)     Tobacco abuse        Medications   albuterol (PROAIR HFA/PROVENTIL HFA/VENTOLIN HFA) 108 (90 Base) MCG/ACT inhaler  ALPRAZolam (XANAX) 0.5 MG tablet  amLODIPine (NORVASC) 10 MG tablet  aspirin EC 81 MG EC tablet  atorvastatin (LIPITOR) 80 MG tablet  buPROPion (WELLBUTRIN XL) 150 MG 24 hr tablet  clonazePAM (KLONOPIN) 0.5 MG tablet  coenzyme Q-10 200 MG CAPS capsule  fluticasone-vilanterol (BREO ELLIPTA) 100-25 MCG/ACT inhaler  furosemide (LASIX) 20 MG tablet  insulin aspart (NOVOLOG PEN) 100 UNIT/ML soln  insulin detemir (LEVEMIR PEN) 100 UNIT/ML pen  JARDIANCE 10 MG TABS tablet  lisinopril-hydrochlorothiazide (ZESTORETIC) 20-12.5 MG tablet  metoprolol tartrate (LOPRESSOR) 100 MG tablet  nitroglycerin (NITROSTAT) 0.4 MG SL tablet        Surgical History   Past Surgical History:   Procedure Laterality Date    ANGIOGRAM  07-11-15    2 vessel  "coronary artery disease (diagonal-, apical LAD, and culprit mid to distal RCA). Successful PCI of culprit mid to distal RCA with placement of a 3.5 x 12 mm and 3.5 x 38 mm Promus drug-eluting stents     CARDIAC CATHETERIZATION      CARDIAC CATHETERIZATION  11/04/2016    no interventions    CARDIAC SURGERY      stent    CARDIAC SURGERY      CORONARY ANGIOPLASTY      CORONARY STENT PLACEMENT  2011    LAD stent, total 3 stents    HERNIA REPAIR      HERNIA REPAIR      KNEE SURGERY      X 2    KNEE SURGERY      NEPHRECTOMY Right 12/12/2016    Procedure: ROBOTIC ASSISTED RIGHT NEPHRECTOMY WITH INTRA OPERATIVE ULTRASOUND;  Surgeon: Makenna Miller MD;  Location: [facility];  Service:        Physical Exam     Patient Vitals for the past 24 hrs:   BP Temp Pulse Resp SpO2 Height Weight   10/07/24 2058 110/69 -- -- -- 95 % -- --   10/07/24 2043 106/72 -- -- -- 98 % -- --   10/07/24 2028 112/69 -- 80 -- 95 % -- --   10/07/24 2013 113/70 -- -- -- 95 % -- --   10/07/24 2003 110/69 -- 80 -- 95 % -- --   10/1958 99/67 -- -- -- 96 % -- --   10/07/24 1942 -- -- -- -- 96 % -- --   10/07/24 1937 123/78 -- 78 -- -- -- --   10/07/24 1922 103/67 97.5  F (36.4  C) 78 20 99 % 1.778 m (5' 10\") 117.2 kg (258 lb 6.1 oz)     Physical Exam  General: Patient is alert and cooperative.  Overweight.   HENT:  Normal nose, oropharynx. Moist oral mucosa.  Eyes: EOMI. Normal conjunctiva.  Neck:  Normal range of motion and appearance.   Cardiovascular:  Normal rate, regular rhythm.   Pulmonary/Chest:  Effort normal. No wheezing or crackles.  Abdominal: Soft. No distension; mild epigastric tenderness.   Musculoskeletal: Normal range of motion.   Neurological: oriented, normal strength, sensation, and coordination.   Skin: Warm and dry. No rash or bruising.   Psychiatric: Normal mood and affect. Normal behavior and judgement.      Diagnostics     Lab Results   Labs Ordered and Resulted from Time of ED Arrival to Time of ED Departure "   COMPREHENSIVE METABOLIC PANEL - Abnormal       Result Value    Sodium 140      Potassium 4.2      Carbon Dioxide (CO2) 24      Anion Gap 15      Urea Nitrogen 30.5 (*)     Creatinine 1.26 (*)     GFR Estimate 63      Calcium 7.5 (*)     Chloride 101      Glucose 221 (*)     Alkaline Phosphatase 84      AST 17      ALT 17      Protein Total 5.7 (*)     Albumin 3.8      Bilirubin Total 0.2     OCCULT BLOOD STOOL - Abnormal    Occult Blood Positive (*)    CBC WITH PLATELETS AND DIFFERENTIAL - Abnormal    WBC Count 16.6 (*)     RBC Count 2.88 (*)     Hemoglobin 8.7 (*)     Hematocrit 27.4 (*)     MCV 95      MCH 30.2      MCHC 31.8      RDW 15.2 (*)     Platelet Count 295      % Neutrophils 70      % Lymphocytes 20      % Monocytes 6      % Eosinophils 1      % Basophils 0      % Immature Granulocytes 2      NRBCs per 100 WBC 0      Absolute Neutrophils 11.7 (*)     Absolute Lymphocytes 3.4      Absolute Monocytes 1.0      Absolute Eosinophils 0.2      Absolute Basophils 0.1      Absolute Immature Granulocytes 0.4      Absolute NRBCs 0.0     INR - Normal    INR 0.95     ETHYL ALCOHOL LEVEL - Normal    Alcohol ethyl <0.01     TYPE AND SCREEN, ADULT    ABO/RH(D) O POS      Antibody Screen Negative      SPECIMEN EXPIRATION DATE [id number]     ABO/RH TYPE AND SCREEN       Imaging   No orders to display       ED Course      Medications Administered   Medications   pantoprazole (PROTONIX) IV push injection 40 mg (40 mg Intravenous $Given 10/7/24 1950)       Procedures   Procedures     Discussion of Management   Admitting HospitalistGabriella    ED Course   I reviewed the patient's medical record.   The patient was seen and examined by myself. I discussed the course of care with the patient including laboratory and diagnostic studies.  he understands and is agreeable to the plan.    Additional Documentation  None    Medical Decision Making / Diagnosis     CMS Diagnoses: None    MIPS       None    [facility]   Lencho Echevarria  is a 63 year old male referred from outside urgent care after presenting there with complaints of some lightheadedness and epigastric abdominal pain with dark stools.  He was found to have a hemoglobin of 8.7 at that time and referred to the emergency department.  He is hemodynamically stable on arrival and well-appearing.  Appears to have some mild epigastric tenderness.  Workup is included guaiac positive stool.  CBC shows a hemoglobin of 8.7 white blood cell count 16.6, platelets 295.  CMP suggest mild dehydration, BUN 30.6, creatinine 1.26.  IV Protonix was administered and he was typed and screened and arranges made for admission to the hospital for further management of what appears to be a first-time upper GI bleed with symptomatic anemia.    Disposition   The patient was admitted to the hospital.     Diagnosis     ICD-10-CM    1. UGIB (upper gastrointestinal bleed)  K92.2       2. Anemia due to blood loss, acute  D62            Discharge Medications   New Prescriptions    No medications on file         MD Carmina Granda Brian A, MD  10/07/24 9970

## 2024-10-08 NOTE — ED TRIAGE NOTES
Patient daughter answered the phone and stated that he will be out of town until August so she will set him up an appointment when he returns. Pt arrives for abnormal labs, sent from clinic, high WBC, low Hgb 8.7, Hgb was 17 3 months ago. Epigastric abdominal pain, dark stool. Takes aspirin daily. Dizziness, SOB. Recently finished z pack and prednisone for URI. AVSS on RA.

## 2024-10-08 NOTE — PLAN OF CARE
"Goal Outcome Evaluation:      Plan of Care Reviewed With: patient    Overall Patient Progress: improvingOverall Patient Progress: improving    Outcome Evaluation: No active bleeding noted, EGD completed refer to chart, hemoglobin stable, continues with on going poc.  A/Ox4, up A1 G/Walker, VSS, on RA, denies pain, sob. Monitoring hemoglobin, continues with poc.     Problem: Adult Inpatient Plan of Care  Goal: Plan of Care Review  Description: The Plan of Care Review/Shift note should be completed every shift.  The Outcome Evaluation is a brief statement about your assessment that the patient is improving, declining, or no change.  This information will be displayed automatically on your shift  note.  Outcome: Progressing  Flowsheets (Taken 10/8/2024 4516)  Outcome Evaluation: No active bleeding noted, EGD completed refer to chart, hemoglobin stable, continues with on going poc.  Plan of Care Reviewed With: patient  Overall Patient Progress: improving  Goal: Patient-Specific Goal (Individualized)  Description: You can add care plan individualizations to a care plan. Examples of Individualization might be:  \"Parent requests to be called daily at 9am for status\", \"I have a hard time hearing out of my right ear\", or \"Do not touch me to wake me up as it startles  me\".  Outcome: Progressing  Goal: Absence of Hospital-Acquired Illness or Injury  Outcome: Progressing  Goal: Optimal Comfort and Wellbeing  Outcome: Progressing  Goal: Readiness for Transition of Care  Outcome: Progressing     Problem: Gastrointestinal Bleeding  Goal: Optimal Coping with Acute Illness  Outcome: Progressing  Goal: Hemostasis  Outcome: Progressing     Problem: Adult Inpatient Plan of Care  Goal: Patient-Specific Goal (Individualized)  Description: You can add care plan individualizations to a care plan. Examples of Individualization might be:  \"Parent requests to be called daily at 9am for status\", \"I have a hard time hearing out of my right " "ear\", or \"Do not touch me to wake me up as it startles  me\".  Outcome: Progressing         "

## 2024-10-09 VITALS
DIASTOLIC BLOOD PRESSURE: 72 MMHG | HEART RATE: 83 BPM | BODY MASS INDEX: 36.99 KG/M2 | HEIGHT: 70 IN | TEMPERATURE: 98 F | RESPIRATION RATE: 16 BRPM | WEIGHT: 258.38 LBS | OXYGEN SATURATION: 98 % | SYSTOLIC BLOOD PRESSURE: 119 MMHG

## 2024-10-09 LAB
GLUCOSE BLDC GLUCOMTR-MCNC: 146 MG/DL (ref 70–99)
GLUCOSE BLDC GLUCOMTR-MCNC: 179 MG/DL (ref 70–99)
HGB BLD-MCNC: 8.2 G/DL (ref 13.3–17.7)
HOLD SPECIMEN: NORMAL
PATH REPORT.COMMENTS IMP SPEC: NORMAL
PATH REPORT.COMMENTS IMP SPEC: NORMAL
PATH REPORT.FINAL DX SPEC: NORMAL
PATH REPORT.GROSS SPEC: NORMAL
PATH REPORT.MICROSCOPIC SPEC OTHER STN: NORMAL
PATH REPORT.RELEVANT HX SPEC: NORMAL
PHOTO IMAGE: NORMAL

## 2024-10-09 PROCEDURE — 99239 HOSP IP/OBS DSCHRG MGMT >30: CPT | Performed by: STUDENT IN AN ORGANIZED HEALTH CARE EDUCATION/TRAINING PROGRAM

## 2024-10-09 PROCEDURE — 250N000009 HC RX 250: Performed by: INTERNAL MEDICINE

## 2024-10-09 PROCEDURE — 82962 GLUCOSE BLOOD TEST: CPT

## 2024-10-09 PROCEDURE — 85018 HEMOGLOBIN: CPT | Performed by: STUDENT IN AN ORGANIZED HEALTH CARE EDUCATION/TRAINING PROGRAM

## 2024-10-09 PROCEDURE — 96376 TX/PRO/DX INJ SAME DRUG ADON: CPT

## 2024-10-09 PROCEDURE — 250N000013 HC RX MED GY IP 250 OP 250 PS 637: Performed by: INTERNAL MEDICINE

## 2024-10-09 PROCEDURE — G0378 HOSPITAL OBSERVATION PER HR: HCPCS

## 2024-10-09 PROCEDURE — 36415 COLL VENOUS BLD VENIPUNCTURE: CPT | Performed by: STUDENT IN AN ORGANIZED HEALTH CARE EDUCATION/TRAINING PROGRAM

## 2024-10-09 RX ORDER — PANTOPRAZOLE SODIUM 40 MG/1
40 TABLET, DELAYED RELEASE ORAL 2 TIMES DAILY
Qty: 60 TABLET | Refills: 0 | Status: SHIPPED | OUTPATIENT
Start: 2024-10-09

## 2024-10-09 RX ADMIN — METOPROLOL TARTRATE 100 MG: 50 TABLET, FILM COATED ORAL at 08:02

## 2024-10-09 RX ADMIN — PANTOPRAZOLE SODIUM 40 MG: 40 INJECTION, POWDER, FOR SOLUTION INTRAVENOUS at 08:04

## 2024-10-09 RX ADMIN — EMPAGLIFLOZIN 10 MG: 10 TABLET, FILM COATED ORAL at 08:02

## 2024-10-09 ASSESSMENT — ACTIVITIES OF DAILY LIVING (ADL)
ADLS_ACUITY_SCORE: 27

## 2024-10-09 NOTE — PLAN OF CARE
"Assessments:  A/O x4. VSS. Up Ax1 wit hwalker. Denies pain, n/v, SOB. Tolerated regular diet. No bm, so signs of bleeding. Voiding without difficulties.      Treatment Plan: continue PPI, Monitor Hgb, GI following, Outpatient colonoscopy in 2 weeks    Bedside Nurse: Evelio Montero RN       Problem: Adult Inpatient Plan of Care  Goal: Plan of Care Review  Description: The Plan of Care Review/Shift note should be completed every shift.  The Outcome Evaluation is a brief statement about your assessment that the patient is improving, declining, or no change.  This information will be displayed automatically on your shift  note.  Outcome: Progressing  Flowsheets (Taken 10/8/2024 2232)  Outcome Evaluation: no s/s of bleeding, VSS  Plan of Care Reviewed With: patient  Goal: Patient-Specific Goal (Individualized)  Description: You can add care plan individualizations to a care plan. Examples of Individualization might be:  \"Parent requests to be called daily at 9am for status\", \"I have a hard time hearing out of my right ear\", or \"Do not touch me to wake me up as it startles  me\".  Outcome: Progressing  Goal: Absence of Hospital-Acquired Illness or Injury  Outcome: Progressing  Intervention: Identify and Manage Fall Risk  Recent Flowsheet Documentation  Taken 10/8/2024 1720 by Evelio Montero RN  Safety Promotion/Fall Prevention:   activity supervised   safety round/check completed  Intervention: Prevent Skin Injury  Recent Flowsheet Documentation  Taken 10/8/2024 1720 by Evelio Montero RN  Body Position: position changed independently  Intervention: Prevent Infection  Recent Flowsheet Documentation  Taken 10/8/2024 1720 by Evelio Montero RN  Infection Prevention: rest/sleep promoted  Goal: Optimal Comfort and Wellbeing  Outcome: Progressing  Goal: Readiness for Transition of Care  Outcome: Progressing     Problem: Gastrointestinal Bleeding  Goal: Optimal Coping with Acute Illness  Outcome: " Progressing  Goal: Hemostasis  Outcome: Progressing       Goal Outcome Evaluation:      Plan of Care Reviewed With: patient      Outcome Evaluation: no s/s of bleeding, VSS

## 2024-10-09 NOTE — PLAN OF CARE
"A/O x4. VSS. Up Ax1/walker. Denies pain. . No BM. Most recent HgB 7.8    Shift events:   Uneventful night, slept well. No significant change in condition.     Treatment Plan:   continue PPI, Monitor Hgb, GI following, Outpatient colonoscopy in 2 weeks                              Problem: Adult Inpatient Plan of Care  Goal: Plan of Care Review  Description: The Plan of Care Review/Shift note should be completed every shift.  The Outcome Evaluation is a brief statement about your assessment that the patient is improving, declining, or no change.  This information will be displayed automatically on your shift  note.  Outcome: Progressing  Flowsheets (Taken 10/9/2024 0626)  Plan of Care Reviewed With: patient  Overall Patient Progress: improving  Goal: Patient-Specific Goal (Individualized)  Description: You can add care plan individualizations to a care plan. Examples of Individualization might be:  \"Parent requests to be called daily at 9am for status\", \"I have a hard time hearing out of my right ear\", or \"Do not touch me to wake me up as it startles  me\".  Outcome: Progressing  Goal: Absence of Hospital-Acquired Illness or Injury  Outcome: Progressing  Intervention: Identify and Manage Fall Risk  Recent Flowsheet Documentation  Taken 10/9/2024 0000 by Genet Piña, RN  Safety Promotion/Fall Prevention: safety round/check completed  Intervention: Prevent Skin Injury  Recent Flowsheet Documentation  Taken 10/9/2024 0000 by Genet Piña, RN  Body Position: position changed independently  Goal: Optimal Comfort and Wellbeing  Outcome: Progressing  Goal: Readiness for Transition of Care  Outcome: Progressing   Goal Outcome Evaluation:      Plan of Care Reviewed With: patient    Overall Patient Progress: improvingOverall Patient Progress: improving           "

## 2024-10-09 NOTE — PLAN OF CARE
Goal Outcome Evaluation:      Plan of Care Reviewed With: patient    Overall Patient Progress: improvingOverall Patient Progress: improving    Outcome Evaluation: Discharge orders    Discharge Note    Patient discharged to home via private vehicle  accompanied by significant other .  IV: Discontinued  Prescriptions printed and given to patient/family.   Belongings reviewed and sent with patient.   Home medications returned to patient: NA  Equipment sent with: N/A.   patient verbalizes understanding of discharge instructions. AVS given to patient.  Additional education completed?  NA

## 2024-10-09 NOTE — DISCHARGE SUMMARY
"[facility]  Hospitalist Discharge Summary      Date of Admission:  10/7/2024  Date of Discharge:  10/9/2024 12:11 PM  Discharging Provider: Ole Light DO  Discharge Service: Hospitalist Service    Discharge Diagnoses   Lencho Echevarria is a 63 year old man with PMH of CAD, obesity, COPD, tobacco abuse, T2DM who was admitted on 10/7/2024 with dizziness, epigastric pain.      He was found to have acute decline in his hemoglobin.  Stool occult positive.  Patient also endorses 1 week of black smelly stools.  Patient underwent EGD with help of GI.  Some duodenitis was found but no obvious source of bleeding was seen.  Hemoglobin remained stable.  He was discharged home in stable condition on a twice daily PPI.        ABLA  Suspect UGIB  Dizziness & epigastric pain:  Presents with 1 week of black stools.  Also endorsed dizziness and epigastric pain.  Hemoglobin shows an acute decline.  Reportedly had a hemoglobin of 17 3 months ago.  Patient underwent EGD with help of GI.  Some duodenitis was found but no obvious source of bleeding was seen.  Hemoglobin remained stable.  He was discharged home in stable condition on a twice daily PPI.     Insulin dependent T2DM:  PTA Lantus, aspart, Jardiance on discharge.     HTN  CAD s/p stenting:  Discontinue PTA lisinopril/hydrochlorothiazide, amlodipine on discharge given normal blood pressure and dizziness on admission.  Will continue PTA metoprolol.  He should have follow-up with his primary doctor in approximately 1 to 2 weeks for repeat blood pressure check.  He may need to restart antihypertensives at that time.  This was discussed with the patient.      COPD: PTA Advair    Clinically Significant Risk Factors     # DMII: A1C = 8.7 % (Ref range: <5.7 %) within past 6 months    # Obesity: Estimated body mass index is 37.07 kg/m  as calculated from the following:    Height as of this encounter: 1.778 m (5' 10\").    Weight as of this encounter: 117.2 kg " (258 lb 6.1 oz).       Follow-ups Needed After Discharge   Follow-up Appointments     Follow-up and recommended labs and tests       Follow up with primary care provider, Jasmin Orlando, within 7 days   for hospital follow- up.  The following labs/tests are recommended: Hgb.    You should have a colonoscopy in the near future.            Unresulted Labs Ordered in the Past 30 Days of this Admission       Date and Time Order Name Status Description    10/8/2024  1:29 PM Surgical Pathology Exam In process         These results will be followed up by PCP    Discharge Disposition   Discharged to home  Condition at discharge: Good    Consultations This Hospital Stay   GASTROENTEROLOGY IP CONSULT    Code Status   Full Code    Time Spent on this Encounter   I, Ole Light DO, personally saw the patient today and spent greater than 30 minutes discharging this patient.       Ole Light DO  [address] SURG[facility]  [address] [place] [address]  Phone: [phone]  Fax: [phone]  ______________________________________________________________________    Physical Exam   Vital Signs: Temp: 98  F (36.7  C) Temp src: Oral BP: 119/72 Pulse: 83   Resp: 16 SpO2: 98 % O2 Device: None (Room air) Oxygen Delivery: 2 LPM  Weight: 258 lbs 6.07 oz  General Appearance: Patient awake & alert.  No apparent distress.   Respiratory: Lungs are CTAB.  Work of breathing appears normal on room air.  Cardiovascular: Regular rate and rhythm.  No murmurs rubs or gallops.  There is no edema present.  GI: Benign.  Soft.  Non-tender.  Bowel sounds active.   Skin: No rashes or lesions exposed skin.  Neuro:  The patient is moving all extremities.  No obvious focal asymmetries.   Other: Patient is appropriate and oriented x3.        Primary Care Physician   Jasmin Orlando    Discharge Orders      Reason for your hospital stay    You were hospitalized for dizziness. You were found to have anemia.   This is thought related to upper GI bleeding.  You underwent EGD which showed some irritation in the first part of the small intestine.  You will need a colonoscopy in the near future as an outpatient.     Follow-up and recommended labs and tests     Follow up with primary care provider, Jasmin Orlando, within 7 days for hospital follow- up.  The following labs/tests are recommended: Hgb.    You should have a colonoscopy in the near future.     Activity    Your activity upon discharge: activity as tolerated     Diet    Follow this diet upon discharge: Regular       Significant Results and Procedures   Most Recent 3 CBC's:  Recent Labs   Lab Test 10/09/24  0737 10/08/24  1514 10/08/24  0643 10/07/24  1948   WBC  --   --  13.3* 16.6*   HGB 8.2* 7.8* 7.5* 8.7*   MCV  --   --  94 95   PLT  --   --  248 295     Most Recent 3 BMP's:  Recent Labs   Lab Test 10/09/24  0826 10/09/24  0109 10/08/24  2131 10/08/24  1018 10/08/24  0643 10/08/24  0124 10/07/24  1948 10/09/23  0758   NA  --   --   --   --  141  --  140 143   POTASSIUM  --   --   --   --  3.8  --  4.2 4.0   CHLORIDE  --   --   --   --  106  --  101 103   CO2  --   --   --   --  25  --  24 28   BUN  --   --   --   --  27.9*  --  30.5* 17.8   CR  --   --   --   --  1.20*  --  1.26* 1.15   ANIONGAP  --   --   --   --  10  --  15 12   SHIVANI  --   --   --   --  7.5*  --  7.5* 8.5*   * 146* 169*   < > 125*   < > 221* 187*    < > = values in this interval not displayed.       Discharge Medications   Current Discharge Medication List        START taking these medications    Details   pantoprazole (PROTONIX) 40 MG EC tablet Take 1 tablet (40 mg) by mouth 2 times daily.  Qty: 60 tablet, Refills: 0    Associated Diagnoses: UGIB (upper gastrointestinal bleed)           CONTINUE these medications which have NOT CHANGED    Details   albuterol (PROAIR HFA/PROVENTIL HFA/VENTOLIN HFA) 108 (90 Base) MCG/ACT inhaler INHALE 2 PUFFS EVERY 4 HOURS AS NEEDED FOR WHEEZE OR FOR  SHORTNESS OF BREATH      aspirin EC 81 MG EC tablet Take 1 tablet (81 mg) by mouth daily  Qty: 30 tablet, Refills: 5    Associated Diagnoses: NSTEMI (non-ST elevated myocardial infarction) (H)      atorvastatin (LIPITOR) 80 MG tablet Take 1 tablet (80 mg) by mouth daily      clonazePAM (KLONOPIN) 0.5 MG tablet Take 0.5 tablets (0.25 mg) by mouth nightly as needed for anxiety      coenzyme Q-10 200 MG CAPS capsule Take 1 capsule (200 mg) by mouth      fluticasone-salmeterol (ADVAIR) 250-50 MCG/ACT inhaler Inhale 1 puff into the lungs 2 times daily.      insulin aspart (NOVOLOG PEN) 100 UNIT/ML soln Inject 8 Units Subcutaneous 3 times daily (with meals)  Qty: 3 Month, Refills: 2    Associated Diagnoses: Diabetes mellitus, type 2 (H)      JARDIANCE 10 MG TABS tablet TAKE 1 TAB BY MOUTH EVERY MORNING      LANTUS SOLOSTAR 100 UNIT/ML soln Inject 28 Units subcutaneously every evening.      metoprolol tartrate (LOPRESSOR) 100 MG tablet Take 1 tablet (100 mg) by mouth 2 times daily    Associated Diagnoses: NSTEMI (non-ST elevated myocardial infarction) (H)      nitroglycerin (NITROSTAT) 0.4 MG SL tablet Place 1 tablet (0.4 mg) under the tongue every 5 minutes as needed for chest pain  Qty: 25 tablet, Refills: 0    Associated Diagnoses: Acute chest pain      ALPRAZolam (XANAX) 0.5 MG tablet Take 0.5 mg by mouth daily as needed           STOP taking these medications       amLODIPine (NORVASC) 10 MG tablet Comments:   Reason for Stopping:         lisinopril-hydrochlorothiazide (ZESTORETIC) 20-12.5 MG tablet Comments:   Reason for Stopping:             Allergies   Allergies   Allergen Reactions    Amoxicillin

## 2024-10-14 ENCOUNTER — TRANSFERRED RECORDS (OUTPATIENT)
Dept: HEALTH INFORMATION MANAGEMENT | Facility: CLINIC | Age: 64
End: 2024-10-14

## 2024-10-28 ENCOUNTER — TRANSFERRED RECORDS (OUTPATIENT)
Dept: HEALTH INFORMATION MANAGEMENT | Facility: CLINIC | Age: 64
End: 2024-10-28
Payer: COMMERCIAL

## 2024-11-21 ENCOUNTER — MEDICAL CORRESPONDENCE (OUTPATIENT)
Dept: HEALTH INFORMATION MANAGEMENT | Facility: CLINIC | Age: 64
End: 2024-11-21
Payer: COMMERCIAL

## 2024-11-21 ENCOUNTER — TRANSFERRED RECORDS (OUTPATIENT)
Dept: HEALTH INFORMATION MANAGEMENT | Facility: CLINIC | Age: 64
End: 2024-11-21
Payer: COMMERCIAL

## 2024-11-21 LAB
CREATININE (EXTERNAL): 1.3 MG/DL (ref 0.5–1.5)
GLUCOSE (EXTERNAL): 103 MG/DL (ref 60–115)
POTASSIUM (EXTERNAL): 3.7 MMOL/L (ref 3.6–5.1)

## 2024-11-25 ENCOUNTER — TRANSCRIBE ORDERS (OUTPATIENT)
Dept: OTHER | Age: 64
End: 2024-11-25

## 2024-11-25 DIAGNOSIS — I50.9 HEART FAILURE, UNSPECIFIED (H): Primary | ICD-10-CM

## 2024-12-10 ENCOUNTER — TRANSFERRED RECORDS (OUTPATIENT)
Dept: HEALTH INFORMATION MANAGEMENT | Facility: CLINIC | Age: 64
End: 2024-12-10

## 2024-12-10 LAB — INR (EXTERNAL): 1.09 (ref 0.91–1.1)

## 2025-01-25 ENCOUNTER — HEALTH MAINTENANCE LETTER (OUTPATIENT)
Age: 65
End: 2025-01-25

## 2025-02-04 LAB
CREATININE (EXTERNAL): 1.1 MG/DL (ref 0.5–1.5)
GLUCOSE (EXTERNAL): 115 MG/DL (ref 60–115)
POTASSIUM (EXTERNAL): 4.7 MMOL/L (ref 3.6–5.1)

## 2025-03-22 ENCOUNTER — HEALTH MAINTENANCE LETTER (OUTPATIENT)
Age: 65
End: 2025-03-22

## 2025-04-29 ENCOUNTER — TRANSFERRED RECORDS (OUTPATIENT)
Dept: HEALTH INFORMATION MANAGEMENT | Facility: CLINIC | Age: 65
End: 2025-04-29
Payer: COMMERCIAL

## 2025-04-29 ENCOUNTER — MEDICAL CORRESPONDENCE (OUTPATIENT)
Dept: HEALTH INFORMATION MANAGEMENT | Facility: CLINIC | Age: 65
End: 2025-04-29
Payer: COMMERCIAL

## 2025-04-30 ENCOUNTER — TRANSCRIBE ORDERS (OUTPATIENT)
Dept: OTHER | Age: 65
End: 2025-04-30

## 2025-04-30 DIAGNOSIS — I50.31 ACUTE DIASTOLIC (CONGESTIVE) HEART FAILURE (H): Primary | ICD-10-CM

## 2025-04-30 DIAGNOSIS — I25.10 ATHEROSCLEROTIC HEART DISEASE OF NATIVE CORONARY ARTERY WITHOUT ANGINA PECTORIS: ICD-10-CM

## 2025-05-01 ENCOUNTER — PATIENT OUTREACH (OUTPATIENT)
Dept: CARE COORDINATION | Facility: CLINIC | Age: 65
End: 2025-05-01
Payer: COMMERCIAL

## 2025-05-03 ENCOUNTER — HEALTH MAINTENANCE LETTER (OUTPATIENT)
Age: 65
End: 2025-05-03

## 2025-05-05 ENCOUNTER — PATIENT OUTREACH (OUTPATIENT)
Dept: CARE COORDINATION | Facility: CLINIC | Age: 65
End: 2025-05-05
Payer: COMMERCIAL

## 2025-08-16 ENCOUNTER — HEALTH MAINTENANCE LETTER (OUTPATIENT)
Age: 65
End: 2025-08-16

## (undated) DEVICE — KIT ENDO TURNOVER/PROCEDURE W/CLEAN A SCOPE LINERS 103888

## (undated) DEVICE — ENDO SNARE HEXAGONAL ISNARE 2.5X4.0CM W/25GA NDL

## (undated) DEVICE — ENDO FORCEP ENDOJAW BIOPSY 2.8MMX160CM FB-220K

## (undated) RX ORDER — FENTANYL CITRATE 50 UG/ML
INJECTION, SOLUTION INTRAMUSCULAR; INTRAVENOUS
Status: DISPENSED
Start: 2024-10-08